# Patient Record
(demographics unavailable — no encounter records)

---

## 2024-10-16 NOTE — REVIEW OF SYSTEMS
[Night Sweats] : night sweats [Fatigue] : fatigue [Palpitations] : palpitations [SOB on Exertion] : shortness of breath during exertion [Diarrhea: Grade 0] : Diarrhea: Grade 0 [Joint Pain] : joint pain [Muscle Pain] : muscle pain [Difficulty Walking] : difficulty walking [Hot Flashes] : hot flashes [Fever] : no fever [Chills] : no chills [Chest Pain] : no chest pain [Lower Ext Edema] : no lower extremity edema [Cough] : no cough [Abdominal Pain] : no abdominal pain [Vomiting] : no vomiting [Constipation] : no constipation [Dysuria] : no dysuria [Incontinence] : no incontinence [Skin Rash] : no skin rash [Dizziness] : no dizziness [de-identified] : skin itchiness

## 2024-10-16 NOTE — ASSESSMENT
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   In March 2024 he was admitted for worsening back pain and difficulty walking.   MRI spine showed cervical cord compression. Discussed to hold off surgery and follow with ortho closely. PSA elevated at 624. Urology consulted placed a stent in left ureter due to outside mass compression to the distal ureter, did not see any mass inside the left ureter. 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   On 4/1/24 pt was started on carbo/etoposide/tecentriq, plan for 4 cycles. Received first dose of lupron 4/3/24.  5/28/24 DEXA scan showed normal density.   9/10/24 CT CAP: Diffuse metastatic disease of the bones is markedly increased since prior exams. Previously noted enlarged hilar lymph nodes are now normal in size. Delayed left nephrogram with mild to moderate left hydroureter and hydronephrosis as well as some mucosal hyperemia (of infectious or inflammatory etiology). Previously present left perinephric stranding has markedly decreased.  9/10/24 NM Bone Scan: Compared to the bone scan in 3/2024, there is overall less intense and less heterogeneous foci of increased activity in the skeleton suggesting partial favorable response to therapy.   Plan  Prostate neuroendocrine Adenocarcinoma  - Given rising PSA level ~ 159.00 pt will will be started on ADT with Abiraterone 1000mg daily + Prednisone 5 mg.  Reviewed potential AEs not limited to Hypertension, diarrhea, abnormal liver function, arthralgia, edema, Skin rash hyperglycemia and anemia. - Continue maintenance of Atezolizumab every 3 weeks  - Continue Lupron [id number], received last dose 7/8/24. Next due 10/2024  - Previously started Carboplatin/etoposide and atezolizumab [id number] on 4/1/24, s/p total for 4 cycles, C4D1 done on 6/3 -  Foundation tissue Dx: BRCA2 loss, BRIP *, myc amplification, Rb loss and p53 mutation -Given his BRCA2 loss, he will start 300mg BID olaparib  -continue to monitor its adverse effects in myelosuppression and nausea, vomiting, constipation vs diarrhea  Bone Health - Dexa scan on 5/29/24 reports Normal bone density. - Continue Ca + Vit D    Pruritis  - Continue on Zyrtec  - Continue to use Moisturizing cream  Pain  - Pt reports that Percocet isn't as effective as just Tylenol - continue gabapentin 300mg qhs  - continue percocet  RTC in 3 weeks.

## 2024-10-16 NOTE — HISTORY OF PRESENT ILLNESS
[de-identified] : Mr. Marti is a 64 yo M with PMHx of HTN, HLD, DM on metformin, chronic left shoulder/knee/hip joint pain; he had the initial medical oncology consultation for finding of left kidney on 3/1/24:  He initially started having inermittent left flank pain rated as 7/10 lasting several hours. He went to [facility] and admitted for 2 days (AMA on Feb 10th 2024 ); he had CT abd/p done there. He was told that he needs nephrectomy but he denied since "his mother had similar situation; she had nephrectomy but pathology was benign". He went to [facility] ER on 2/11/24 and had CT abd/p done. He was discharged from ER on the same day. He reports intermittent generalized fatigue, poor appetite for several months, and lost 30 lb in 8 months. He admits intermittent bone/muscle pain for several months. Denied hematuria, urine difficulty, fever, SOB at rest. He is able to walk 1-2 blocks but has   SOB on exertion.    -2/9/24 CT abd/p with IV contrast showing moderate left hydroureteronephrosis extending to the left distal ureter with a previous left ureteral mass. Findings suggestive of rupture of the left renal pelvis with prominent edema and fluid around the left kidney as well as delayed left nephrogram. Multiple enlarged retroperitoneal lymph nodes suspicious for metastases. Multiple scattered lytic and osseous sclerotic lesions concerning for metastatic disease.   -2/9/24 wbc 5.4 Hb 12.1 MCV 78.6% plt 266; K5.3 Cr 0.9 BUN 19 ; Ca 8.5 ALT 79; AST 67  albumin 3.3 Glucose 463.  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 Associated upstream mild hydroureteronephrosis. Extensive perinephric/periureteral stranding and fluid may reflect associated renal pelvic calyceal rupture as reported. Nonspecific renal pelvis and ureteral enhancement as well as bladder wall thickening. Recommend correlation with urinalysis for superimposed infection. Evidence of cheryl and osseous metastatic disease.  3/1/2024 patient had initial consultation and recommended for tissue biopsy, referred to urology. Percocet was prescribed for back pain.  3/1/24 CTA showed no pulmonary embolus, nonspecific bilateral hilar lymph nodes. diffuse skeletal metastases. 3/8/2024 Pt had Bone scan which shows widespread extensive skeletal metastasis.    Medication glipiz//metformin 5-500mg two tablets  bid  losartan one tablet once daily  jardiance 10mg daily   PSHx appendectomy >30-40 yrs ago SHx Denies ETOH/smoking. 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  FHx Denies family history of cancers/malignancies.  Allergy NKDA   3/8 Patient p/w an urgent visit. Son (Conner) and daughter(Ingrid) present. According to patient and family, patient has been having worsening pain, especially left lower back and pelvic pressure which causes left leg numbness. He was not able to walk properly for 3 days now.  Feels nausea and not able to eat well, constipated, groin pressure, difficulty urination. Percocet 5/325 does not improve his pain. plan to see urology (Dr. Jefferson) on 3/19 for tissue biopsy consultation.  3/25 he was admitted to hospital b/o rapidly worsening left flank, back pain and difficulty walking rapidly worsening left flank, back pain and difficulty walking  MRI spine showed cervical cord compression. Discussed to hold off surgery and follow with ortho closely. PSA elevated at 624. Urology consulted placed a stent in left ureter due to outside mass compression to the distal ureter, did not see any mass inside the left ureter. 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 Reports his left shoulder pain was resolved, left hip pain and lower back pain was improved. HIs dexamethasone was tapered off. He is on percocet 5-325 1tab every 88hours as needed and lidocaine topical film.  [de-identified] : prostate  [de-identified] : 4/19/24: Patient has b/l leg pain. He says he has had this pain since diagnosis, but feels it has worsened in the last week. He feels the pain when he walks, it is a throbbing sensation in his knees and calves. Patient endorses ongoing numbness in toes, no change from prior, says massage helps. Patient reports muscle twitching in arms and legs, family member endorses that they can visibly see muscles tensing.   Patient reports pain in L upper back near scapula, says it radiates around his axilla to his chest. This pain has been there for the past week. Patient feels the pain at rest and it is worse when he moves. On a scale of 1-10, the pain is rated as a 5, when he takes percocet it  improves to a 4..   Patient endorses shortness of breath on exertion, says this developed in the last three weeks, can walk 50-60 feet before becoming short of breath. He endorses palpitations on exertion. He denies cough and shortness of breath at rest. He denies chest pain.   After chemotherapy patient felt more tired for about the first week, after that he got back to his normal routine but has been doing things a bit slower. Says appetite is okay. Has not had fever or chills. No nausea or vomiting. Is slightly constipated, will sometimes go two days without having a BM. Denies sores in mouth and changes in taste. Denies rashes, does have dry skin. Reports urinary urgency, denies hematuria and dysuria.    5/29/24 reports random chill, hot flash and sweating and fatigue, improving his pain in the left upper back, bilateral legs and left pelvis. ALso states nausea, denies constipation and diarrhea. Also reports skin itchiness in both lower legs in last 4 weeks.   7/1/24 reports feeling better after completing a total of 4 cycles of chemotherapy. He reports hot flashes and sweating, shortness of breath, minor memory loss, pain in the left shoulder blade when moving around. He eats better now.   7/22/24 today is feeling well, Endorses nausea, no vomiting but with occasional constipation. He also notes pimple like cyst around the anus likely external hemorrhoids. He will increase fiber and fluids intake. In the event he is experiencing discomfort then he can use OTC witch hezel to help ease pain. Tolerating Tencentriq/ LUPRON but endorses sweating and cold alternation but tolerable.   8/14/24 pt is doing well, not in acute distress. Dependent on assistive device to get around [cane]. Endorses improvement in anal discomfort since increasing fiber and fluids. Urine flow is strong, and empties bladder completely. Appetite is good and maintaining weight. He continues to take one tablet of oxycodone due to chronic left shoulder pain, and b/l Knee joint pain [provides adequate relieve]  8/30/24 reports left shoulder, left shoulder blade and new pain in the left lower rib cage pain. Had normal appetite and energy level.   9/25/24 Pt returns today with c/o chronic left shoulder pain during movement but not at rest and denies dyspnea.  Appetite is fair and has since lost ~ 7 pounds. Pt advised to eat small frequent meals. Abdominal is soft, ND, + BS. No diarrhea or constipation. Urinary flow is strong and empties bladder completely. denies hematuria or urgency. Nocturia 5 x day, pt to reduce fluid intake during evening hours. He also endorses difficulty completing ADLs task due to shoulder pain.   10/16/24 yesterday devloped a fever 102 relieved by tylenol. So far he is afebrile. Reports multiple joint pain including bilateral hip, elbow and right shoulder. Eats btter. Reports hot flash and no sweating. He is chair bound.

## 2024-10-16 NOTE — ASSESSMENT
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   In March 2024 he was admitted for worsening back pain and difficulty walking.   MRI spine showed cervical cord compression. Discussed to hold off surgery and follow with ortho closely. PSA elevated at 624. Urology consulted placed a stent in left ureter due to outside mass compression to the distal ureter, did not see any mass inside the left ureter. 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   On 4/1/24 pt was started on carbo/etoposide/tecentriq, plan for 4 cycles. Received first dose of lupron 4/3/24.  5/28/24 DEXA scan showed normal density.   9/10/24 CT CAP: Diffuse metastatic disease of the bones is markedly increased since prior exams. Previously noted enlarged hilar lymph nodes are now normal in size. Delayed left nephrogram with mild to moderate left hydroureter and hydronephrosis as well as some mucosal hyperemia (of infectious or inflammatory etiology). Previously present left perinephric stranding has markedly decreased.  9/10/24 NM Bone Scan: Compared to the bone scan in 3/2024, there is overall less intense and less heterogeneous foci of increased activity in the skeleton suggesting partial favorable response to therapy.   Plan  Prostate neuroendocrine Adenocarcinoma  - Given rising PSA level ~ 159.00 pt will will be started on ADT with Abiraterone 1000mg daily + Prednisone 5 mg.  Reviewed potential AEs not limited to Hypertension, diarrhea, abnormal liver function, arthralgia, edema, Skin rash hyperglycemia and anemia. - Continue maintenance of Atezolizumab every 3 weeks  - Continue Lupron [id number], received last dose 7/8/24. Next due 10/2024  - Previously started Carboplatin/etoposide and atezolizumab [id number] on 4/1/24, s/p total for 4 cycles, C4D1 done on 6/3 -  Foundation tissue Dx: BRCA2 loss, BRIP *, myc amplification, Rb loss and p53 mutation -Given his BRCA2 loss, he will start 300mg BID olaparib  -continue to monitor its adverse effects in myelosuppression and nausea, vomiting, constipation vs diarrhea  Bone Health - Dexa scan on 5/29/24 reports Normal bone density. - Continue Ca + Vit D    Pruritis  - Continue on Zyrtec  - Continue to use Moisturizing cream  Pain  - Pt reports that Percocet isn't as effective as just Tylenol - continue gabapentin 300mg qhs  - continue percocet  RTC in 3 weeks.

## 2024-10-16 NOTE — PHYSICAL EXAM
[Normal] : affect appropriate [Capable of only limited self care, confined to bed or chair more than 50% of waking hours] : Status 3- Capable of only limited self care, confined to bed or chair more than 50% of waking hours [de-identified] : anicteric  [de-identified] : no edema  [de-identified] : mild tenderness to palpation of L scapula

## 2024-10-16 NOTE — HISTORY OF PRESENT ILLNESS
[de-identified] : Mr. Marti is a 64 yo M with PMHx of HTN, HLD, DM on metformin, chronic left shoulder/knee/hip joint pain; he had the initial medical oncology consultation for finding of left kidney on 3/1/24:  He initially started having inermittent left flank pain rated as 7/10 lasting several hours. He went to [facility] and admitted for 2 days (AMA on Feb 10th 2024 ); he had CT abd/p done there. He was told that he needs nephrectomy but he denied since "his mother had similar situation; she had nephrectomy but pathology was benign". He went to [facility] ER on 2/11/24 and had CT abd/p done. He was discharged from ER on the same day. He reports intermittent generalized fatigue, poor appetite for several months, and lost 30 lb in 8 months. He admits intermittent bone/muscle pain for several months. Denied hematuria, urine difficulty, fever, SOB at rest. He is able to walk 1-2 blocks but has   SOB on exertion.    -2/9/24 CT abd/p with IV contrast showing moderate left hydroureteronephrosis extending to the left distal ureter with a previous left ureteral mass. Findings suggestive of rupture of the left renal pelvis with prominent edema and fluid around the left kidney as well as delayed left nephrogram. Multiple enlarged retroperitoneal lymph nodes suspicious for metastases. Multiple scattered lytic and osseous sclerotic lesions concerning for metastatic disease.   -2/9/24 wbc 5.4 Hb 12.1 MCV 78.6% plt 266; K5.3 Cr 0.9 BUN 19 ; Ca 8.5 ALT 79; AST 67  albumin 3.3 Glucose 463.  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 Associated upstream mild hydroureteronephrosis. Extensive perinephric/periureteral stranding and fluid may reflect associated renal pelvic calyceal rupture as reported. Nonspecific renal pelvis and ureteral enhancement as well as bladder wall thickening. Recommend correlation with urinalysis for superimposed infection. Evidence of cheryl and osseous metastatic disease.  3/1/2024 patient had initial consultation and recommended for tissue biopsy, referred to urology. Percocet was prescribed for back pain.  3/1/24 CTA showed no pulmonary embolus, nonspecific bilateral hilar lymph nodes. diffuse skeletal metastases. 3/8/2024 Pt had Bone scan which shows widespread extensive skeletal metastasis.    Medication glipiz//metformin 5-500mg two tablets  bid  losartan one tablet once daily  jardiance 10mg daily   PSHx appendectomy >30-40 yrs ago SHx Denies ETOH/smoking. 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  FHx Denies family history of cancers/malignancies.  Allergy NKDA   3/8 Patient p/w an urgent visit. Son (Conner) and daughter(Ingrid) present. According to patient and family, patient has been having worsening pain, especially left lower back and pelvic pressure which causes left leg numbness. He was not able to walk properly for 3 days now.  Feels nausea and not able to eat well, constipated, groin pressure, difficulty urination. Percocet 5/325 does not improve his pain. plan to see urology (Dr. Jefferson) on 3/19 for tissue biopsy consultation.  3/25 he was admitted to hospital b/o rapidly worsening left flank, back pain and difficulty walking rapidly worsening left flank, back pain and difficulty walking  MRI spine showed cervical cord compression. Discussed to hold off surgery and follow with ortho closely. PSA elevated at 624. Urology consulted placed a stent in left ureter due to outside mass compression to the distal ureter, did not see any mass inside the left ureter. 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 Reports his left shoulder pain was resolved, left hip pain and lower back pain was improved. HIs dexamethasone was tapered off. He is on percocet 5-325 1tab every 88hours as needed and lidocaine topical film.  [de-identified] : prostate  [de-identified] : 4/19/24: Patient has b/l leg pain. He says he has had this pain since diagnosis, but feels it has worsened in the last week. He feels the pain when he walks, it is a throbbing sensation in his knees and calves. Patient endorses ongoing numbness in toes, no change from prior, says massage helps. Patient reports muscle twitching in arms and legs, family member endorses that they can visibly see muscles tensing.   Patient reports pain in L upper back near scapula, says it radiates around his axilla to his chest. This pain has been there for the past week. Patient feels the pain at rest and it is worse when he moves. On a scale of 1-10, the pain is rated as a 5, when he takes percocet it  improves to a 4..   Patient endorses shortness of breath on exertion, says this developed in the last three weeks, can walk 50-60 feet before becoming short of breath. He endorses palpitations on exertion. He denies cough and shortness of breath at rest. He denies chest pain.   After chemotherapy patient felt more tired for about the first week, after that he got back to his normal routine but has been doing things a bit slower. Says appetite is okay. Has not had fever or chills. No nausea or vomiting. Is slightly constipated, will sometimes go two days without having a BM. Denies sores in mouth and changes in taste. Denies rashes, does have dry skin. Reports urinary urgency, denies hematuria and dysuria.    5/29/24 reports random chill, hot flash and sweating and fatigue, improving his pain in the left upper back, bilateral legs and left pelvis. ALso states nausea, denies constipation and diarrhea. Also reports skin itchiness in both lower legs in last 4 weeks.   7/1/24 reports feeling better after completing a total of 4 cycles of chemotherapy. He reports hot flashes and sweating, shortness of breath, minor memory loss, pain in the left shoulder blade when moving around. He eats better now.   7/22/24 today is feeling well, Endorses nausea, no vomiting but with occasional constipation. He also notes pimple like cyst around the anus likely external hemorrhoids. He will increase fiber and fluids intake. In the event he is experiencing discomfort then he can use OTC witch hezel to help ease pain. Tolerating Tencentriq/ LUPRON but endorses sweating and cold alternation but tolerable.   8/14/24 pt is doing well, not in acute distress. Dependent on assistive device to get around [cane]. Endorses improvement in anal discomfort since increasing fiber and fluids. Urine flow is strong, and empties bladder completely. Appetite is good and maintaining weight. He continues to take one tablet of oxycodone due to chronic left shoulder pain, and b/l Knee joint pain [provides adequate relieve]  8/30/24 reports left shoulder, left shoulder blade and new pain in the left lower rib cage pain. Had normal appetite and energy level.   9/25/24 Pt returns today with c/o chronic left shoulder pain during movement but not at rest and denies dyspnea.  Appetite is fair and has since lost ~ 7 pounds. Pt advised to eat small frequent meals. Abdominal is soft, ND, + BS. No diarrhea or constipation. Urinary flow is strong and empties bladder completely. denies hematuria or urgency. Nocturia 5 x day, pt to reduce fluid intake during evening hours. He also endorses difficulty completing ADLs task due to shoulder pain.   10/16/24 yesterday devloped a fever 102 relieved by tylenol. So far he is afebrile. Reports multiple joint pain including bilateral hip, elbow and right shoulder. Eats btter. Reports hot flash and no sweating. He is chair bound.

## 2024-10-16 NOTE — REASON FOR VISIT
[Follow-Up Visit] : a follow-up [Family Member] : family member [FreeTextEntry2] : left renal and distal ureteral mass

## 2024-10-16 NOTE — PHYSICAL EXAM
[Normal] : affect appropriate [Capable of only limited self care, confined to bed or chair more than 50% of waking hours] : Status 3- Capable of only limited self care, confined to bed or chair more than 50% of waking hours [de-identified] : anicteric  [de-identified] : no edema  [de-identified] : mild tenderness to palpation of L scapula

## 2024-10-16 NOTE — REVIEW OF SYSTEMS
[Night Sweats] : night sweats [Fatigue] : fatigue [Palpitations] : palpitations [SOB on Exertion] : shortness of breath during exertion [Diarrhea: Grade 0] : Diarrhea: Grade 0 [Joint Pain] : joint pain [Muscle Pain] : muscle pain [Difficulty Walking] : difficulty walking [Hot Flashes] : hot flashes [Fever] : no fever [Chills] : no chills [Chest Pain] : no chest pain [Lower Ext Edema] : no lower extremity edema [Cough] : no cough [Abdominal Pain] : no abdominal pain [Vomiting] : no vomiting [Constipation] : no constipation [Dysuria] : no dysuria [Incontinence] : no incontinence [Skin Rash] : no skin rash [Dizziness] : no dizziness [de-identified] : skin itchiness

## 2024-11-07 NOTE — ASSESSMENT
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   In March 2024 he was admitted for worsening back pain and difficulty walking.   MRI spine showed cervical cord compression. Discussed to hold off surgery and follow with ortho closely. PSA elevated at 624. Urology consulted placed a stent in left ureter due to outside mass compression to the distal ureter, did not see any mass inside the left ureter. 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   On 4/1/24 pt was started on carbo/etoposide/tecentriq, plan for 4 cycles. Received first dose of lupron 4/3/24.  5/28/24 DEXA scan showed normal density.   9/10/24 CT CAP: Diffuse metastatic disease of the bones is markedly increased since prior exams. Previously noted enlarged hilar lymph nodes are now normal in size. Delayed left nephrogram with mild to moderate left hydroureter and hydronephrosis as well as some mucosal hyperemia (of infectious or inflammatory etiology). Previously present left perinephric stranding has markedly decreased.  9/10/24 NM Bone Scan: Compared to the bone scan in 3/2024, there is overall less intense and less heterogeneous foci of increased activity in the skeleton suggesting partial favorable response to therapy.   Plan   Prostate neuroendocrine adenocarcinoma: - PSA lennie rapidly from 57.3 on 9/3/24 to 159 on 9/25/24.  - Foundation tissue Dx: BRCA2 loss, BRIP *, myc amplification, Rb loss and p53 mutation - Continue abiraterone 1,000mg daily + prednisone 5mg daily and olaparib 300mg bid. Reviewed potential AEs not limited to hypertension, abnormal liver function, edema, arthralgia, diarrhea, skin rash, hyperglycemia, cytopenias, fatigue, nausea/vomiting, decreased appetite.  - Continue Lupron [id number], last given 10/19/24, next scheduled for 1/16/25.  - PSA is 359 today, continues to rise rapidly however he has only been on this treatment for about 1 month. No symptoms concerning for clinical progression. Per d/w Dr. Vergara, continue abiraterone + olaparib as prescribed with close monitoring.   Bone mets: - Dexa scan on 5/29/24 reports Normal bone density. 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 Potential side effects reviewed including but not limited to hypocalcemia and ONJ. Instructed to obtain dental clearance prior to start of Xgeva.   Pain  - Ongoing pain of left scapula and L posterior ribs, bilateral shoulders, bilateral knees, bilateral ankles, somewhat improved, max pain is 5/10. Taking PRN Percocet 5/325mg occasionally but not every day, this has been effective.  - Continue gabapentin 300mg qhs  - continue PRN Percocet 5/325mg as prescribed.   Instructed to contact our office with any new/worsening symptoms. Pt educated regarding plan of care, all questions/concerns addressed to the best of my abilities and his apparent satisfaction. F/u in 3wks for close monitoring at start of treatment with abiraterone + olaparib.

## 2024-11-07 NOTE — HISTORY OF PRESENT ILLNESS
[de-identified] : Mr. Marti is a 62 yo M with PMHx of HTN, HLD, DM on metformin, chronic left shoulder/knee/hip joint pain; he had the initial medical oncology consultation for finding of left kidney on 3/1/24:  He initially started having inermittent left flank pain rated as 7/10 lasting several hours. He went to [facility] and admitted for 2 days (AMA on Feb 10th 2024 ); he had CT abd/p done there. He was told that he needs nephrectomy but he denied since "his mother had similar situation; she had nephrectomy but pathology was benign". He went to [facility] ER on 2/11/24 and had CT abd/p done. He was discharged from ER on the same day. He reports intermittent generalized fatigue, poor appetite for several months, and lost 30 lb in 8 months. He admits intermittent bone/muscle pain for several months. Denied hematuria, urine difficulty, fever, SOB at rest. He is able to walk 1-2 blocks but has   SOB on exertion.    -2/9/24 CT abd/p with IV contrast showing moderate left hydroureteronephrosis extending to the left distal ureter with a previous left ureteral mass. Findings suggestive of rupture of the left renal pelvis with prominent edema and fluid around the left kidney as well as delayed left nephrogram. Multiple enlarged retroperitoneal lymph nodes suspicious for metastases. Multiple scattered lytic and osseous sclerotic lesions concerning for metastatic disease.   -2/9/24 wbc 5.4 Hb 12.1 MCV 78.6% plt 266; K5.3 Cr 0.9 BUN 19 ; Ca 8.5 ALT 79; AST 67  albumin 3.3 Glucose 463.  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 Associated upstream mild hydroureteronephrosis. Extensive perinephric/periureteral stranding and fluid may reflect associated renal pelvic calyceal rupture as reported. Nonspecific renal pelvis and ureteral enhancement as well as bladder wall thickening. Recommend correlation with urinalysis for superimposed infection. Evidence of cheryl and osseous metastatic disease.  3/1/2024 patient had initial consultation and recommended for tissue biopsy, referred to urology. Percocet was prescribed for back pain.  3/1/24 CTA showed no pulmonary embolus, nonspecific bilateral hilar lymph nodes. diffuse skeletal metastases. 3/8/2024 Pt had Bone scan which shows widespread extensive skeletal metastasis.    Medication glipiz//metformin 5-500mg two tablets  bid  losartan one tablet once daily  jardiance 10mg daily   PSHx appendectomy >30-40 yrs ago SHx Denies ETOH/smoking. 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  FHx Denies family history of cancers/malignancies.  Allergy NKDA   3/8 Patient p/w an urgent visit. Son (Conner) and daughter(Ingrid) present. According to patient and family, patient has been having worsening pain, especially left lower back and pelvic pressure which causes left leg numbness. He was not able to walk properly for 3 days now.  Feels nausea and not able to eat well, constipated, groin pressure, difficulty urination. Percocet 5/325 does not improve his pain. plan to see urology (Dr. Jefferson) on 3/19 for tissue biopsy consultation.  3/25 he was admitted to hospital b/o rapidly worsening left flank, back pain and difficulty walking rapidly worsening left flank, back pain and difficulty walking  MRI spine showed cervical cord compression. Discussed to hold off surgery and follow with ortho closely. PSA elevated at 624. Urology consulted placed a stent in left ureter due to outside mass compression to the distal ureter, did not see any mass inside the left ureter. 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 Reports his left shoulder pain was resolved, left hip pain and lower back pain was improved. HIs dexamethasone was tapered off. He is on percocet 5-325 1tab every 88hours as needed and lidocaine topical film.  [de-identified] : prostate  [de-identified] : 4/19/24: Patient has b/l leg pain. He says he has had this pain since diagnosis, but feels it has worsened in the last week. He feels the pain when he walks, it is a throbbing sensation in his knees and calves. Patient endorses ongoing numbness in toes, no change from prior, says massage helps. Patient reports muscle twitching in arms and legs, family member endorses that they can visibly see muscles tensing.   Patient reports pain in L upper back near scapula, says it radiates around his axilla to his chest. This pain has been there for the past week. Patient feels the pain at rest and it is worse when he moves. On a scale of 1-10, the pain is rated as a 5, when he takes percocet it  improves to a 4..   Patient endorses shortness of breath on exertion, says this developed in the last three weeks, can walk 50-60 feet before becoming short of breath. He endorses palpitations on exertion. He denies cough and shortness of breath at rest. He denies chest pain.   After chemotherapy patient felt more tired for about the first week, after that he got back to his normal routine but has been doing things a bit slower. Says appetite is okay. Has not had fever or chills. No nausea or vomiting. Is slightly constipated, will sometimes go two days without having a BM. Denies sores in mouth and changes in taste. Denies rashes, does have dry skin. Reports urinary urgency, denies hematuria and dysuria.    5/29/24 reports random chill, hot flash and sweating and fatigue, improving his pain in the left upper back, bilateral legs and left pelvis. ALso states nausea, denies constipation and diarrhea. Also reports skin itchiness in both lower legs in last 4 weeks.   7/1/24 reports feeling better after completing a total of 4 cycles of chemotherapy. He reports hot flashes and sweating, shortness of breath, minor memory loss, pain in the left shoulder blade when moving around. He eats better now.   7/22/24 today is feeling well, Endorses nausea, no vomiting but with occasional constipation. He also notes pimple like cyst around the anus likely external hemorrhoids. He will increase fiber and fluids intake. In the event he is experiencing discomfort then he can use OTC witch hezel to help ease pain. Tolerating Tencentriq/ LUPRON but endorses sweating and cold alternation but tolerable.   8/14/24 pt is doing well, not in acute distress. Dependent on assistive device to get around [cane]. Endorses improvement in anal discomfort since increasing fiber and fluids. Urine flow is strong, and empties bladder completely. Appetite is good and maintaining weight. He continues to take one tablet of oxycodone due to chronic left shoulder pain, and b/l Knee joint pain [provides adequate relieve]  8/30/24 reports left shoulder, left shoulder blade and new pain in the left lower rib cage pain. Had normal appetite and energy level.   9/25/24 Pt returns today with c/o chronic left shoulder pain during movement but not at rest and denies dyspnea.  Appetite is fair and has since lost ~ 7 pounds. Pt advised to eat small frequent meals. Abdominal is soft, ND, + BS. No diarrhea or constipation. Urinary flow is strong and empties bladder completely. denies hematuria or urgency. Nocturia 5 x day, pt to reduce fluid intake during evening hours. He also endorses difficulty completing ADLs task due to shoulder pain.   10/16/24 yesterday devloped a fever 102 relieved by tylenol. So far he is afebrile. Reports multiple joint pain including bilateral hip, elbow and right shoulder. Eats btter. Reports hot flash and no sweating. He is chair bound.   11/6/25 - abiraterone started late Sept 2024 + olaparib started mid Oct 2024. Notes fatigue, with 2-3 naps a day which is stable since before start of abiraterone and olaparib. Ongoing hot flashes. Dizziness and nausea with standing for too long. Needs assistance with ambulation, dressing, bathing. Can walk short distances with a 1 person assist, does not use a walker as it doesn't fit in his house. Appetite is stable. Mild SOB with exertion, resolves with rest. Urine flow and urgency are improved, 3-4x/night. Ongoing pain of left scapula and L posterior ribs, bilateral shoulders, bilateral knees, bilateral ankles, somewhat improved, max pain is 5/10. Taking PRN Percocet 5/325mg occasionally but not every day.

## 2024-11-07 NOTE — HISTORY OF PRESENT ILLNESS
[de-identified] : Mr. Marti is a 64 yo M with PMHx of HTN, HLD, DM on metformin, chronic left shoulder/knee/hip joint pain; he had the initial medical oncology consultation for finding of left kidney on 3/1/24:  He initially started having inermittent left flank pain rated as 7/10 lasting several hours. He went to [facility] and admitted for 2 days (AMA on Feb 10th 2024 ); he had CT abd/p done there. He was told that he needs nephrectomy but he denied since "his mother had similar situation; she had nephrectomy but pathology was benign". He went to [facility] ER on 2/11/24 and had CT abd/p done. He was discharged from ER on the same day. He reports intermittent generalized fatigue, poor appetite for several months, and lost 30 lb in 8 months. He admits intermittent bone/muscle pain for several months. Denied hematuria, urine difficulty, fever, SOB at rest. He is able to walk 1-2 blocks but has   SOB on exertion.    -2/9/24 CT abd/p with IV contrast showing moderate left hydroureteronephrosis extending to the left distal ureter with a previous left ureteral mass. Findings suggestive of rupture of the left renal pelvis with prominent edema and fluid around the left kidney as well as delayed left nephrogram. Multiple enlarged retroperitoneal lymph nodes suspicious for metastases. Multiple scattered lytic and osseous sclerotic lesions concerning for metastatic disease.   -2/9/24 wbc 5.4 Hb 12.1 MCV 78.6% plt 266; K5.3 Cr 0.9 BUN 19 ; Ca 8.5 ALT 79; AST 67  albumin 3.3 Glucose 463.  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 Associated upstream mild hydroureteronephrosis. Extensive perinephric/periureteral stranding and fluid may reflect associated renal pelvic calyceal rupture as reported. Nonspecific renal pelvis and ureteral enhancement as well as bladder wall thickening. Recommend correlation with urinalysis for superimposed infection. Evidence of cheryl and osseous metastatic disease.  3/1/2024 patient had initial consultation and recommended for tissue biopsy, referred to urology. Percocet was prescribed for back pain.  3/1/24 CTA showed no pulmonary embolus, nonspecific bilateral hilar lymph nodes. diffuse skeletal metastases. 3/8/2024 Pt had Bone scan which shows widespread extensive skeletal metastasis.    Medication glipiz//metformin 5-500mg two tablets  bid  losartan one tablet once daily  jardiance 10mg daily   PSHx appendectomy >30-40 yrs ago SHx Denies ETOH/smoking. 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  FHx Denies family history of cancers/malignancies.  Allergy NKDA   3/8 Patient p/w an urgent visit. Son (Conner) and daughter(Ingrid) present. According to patient and family, patient has been having worsening pain, especially left lower back and pelvic pressure which causes left leg numbness. He was not able to walk properly for 3 days now.  Feels nausea and not able to eat well, constipated, groin pressure, difficulty urination. Percocet 5/325 does not improve his pain. plan to see urology (Dr. Jefferson) on 3/19 for tissue biopsy consultation.  3/25 he was admitted to hospital b/o rapidly worsening left flank, back pain and difficulty walking rapidly worsening left flank, back pain and difficulty walking  MRI spine showed cervical cord compression. Discussed to hold off surgery and follow with ortho closely. PSA elevated at 624. Urology consulted placed a stent in left ureter due to outside mass compression to the distal ureter, did not see any mass inside the left ureter. 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 Reports his left shoulder pain was resolved, left hip pain and lower back pain was improved. HIs dexamethasone was tapered off. He is on percocet 5-325 1tab every 88hours as needed and lidocaine topical film.  [de-identified] : prostate  [de-identified] : 4/19/24: Patient has b/l leg pain. He says he has had this pain since diagnosis, but feels it has worsened in the last week. He feels the pain when he walks, it is a throbbing sensation in his knees and calves. Patient endorses ongoing numbness in toes, no change from prior, says massage helps. Patient reports muscle twitching in arms and legs, family member endorses that they can visibly see muscles tensing.   Patient reports pain in L upper back near scapula, says it radiates around his axilla to his chest. This pain has been there for the past week. Patient feels the pain at rest and it is worse when he moves. On a scale of 1-10, the pain is rated as a 5, when he takes percocet it  improves to a 4..   Patient endorses shortness of breath on exertion, says this developed in the last three weeks, can walk 50-60 feet before becoming short of breath. He endorses palpitations on exertion. He denies cough and shortness of breath at rest. He denies chest pain.   After chemotherapy patient felt more tired for about the first week, after that he got back to his normal routine but has been doing things a bit slower. Says appetite is okay. Has not had fever or chills. No nausea or vomiting. Is slightly constipated, will sometimes go two days without having a BM. Denies sores in mouth and changes in taste. Denies rashes, does have dry skin. Reports urinary urgency, denies hematuria and dysuria.    5/29/24 reports random chill, hot flash and sweating and fatigue, improving his pain in the left upper back, bilateral legs and left pelvis. ALso states nausea, denies constipation and diarrhea. Also reports skin itchiness in both lower legs in last 4 weeks.   7/1/24 reports feeling better after completing a total of 4 cycles of chemotherapy. He reports hot flashes and sweating, shortness of breath, minor memory loss, pain in the left shoulder blade when moving around. He eats better now.   7/22/24 today is feeling well, Endorses nausea, no vomiting but with occasional constipation. He also notes pimple like cyst around the anus likely external hemorrhoids. He will increase fiber and fluids intake. In the event he is experiencing discomfort then he can use OTC witch hezel to help ease pain. Tolerating Tencentriq/ LUPRON but endorses sweating and cold alternation but tolerable.   8/14/24 pt is doing well, not in acute distress. Dependent on assistive device to get around [cane]. Endorses improvement in anal discomfort since increasing fiber and fluids. Urine flow is strong, and empties bladder completely. Appetite is good and maintaining weight. He continues to take one tablet of oxycodone due to chronic left shoulder pain, and b/l Knee joint pain [provides adequate relieve]  8/30/24 reports left shoulder, left shoulder blade and new pain in the left lower rib cage pain. Had normal appetite and energy level.   9/25/24 Pt returns today with c/o chronic left shoulder pain during movement but not at rest and denies dyspnea.  Appetite is fair and has since lost ~ 7 pounds. Pt advised to eat small frequent meals. Abdominal is soft, ND, + BS. No diarrhea or constipation. Urinary flow is strong and empties bladder completely. denies hematuria or urgency. Nocturia 5 x day, pt to reduce fluid intake during evening hours. He also endorses difficulty completing ADLs task due to shoulder pain.   10/16/24 yesterday devloped a fever 102 relieved by tylenol. So far he is afebrile. Reports multiple joint pain including bilateral hip, elbow and right shoulder. Eats btter. Reports hot flash and no sweating. He is chair bound.   11/6/25 - abiraterone started late Sept 2024 + olaparib started mid Oct 2024. Notes fatigue, with 2-3 naps a day which is stable since before start of abiraterone and olaparib. Ongoing hot flashes. Dizziness and nausea with standing for too long. Needs assistance with ambulation, dressing, bathing. Can walk short distances with a 1 person assist, does not use a walker as it doesn't fit in his house. Appetite is stable. Mild SOB with exertion, resolves with rest. Urine flow and urgency are improved, 3-4x/night. Ongoing pain of left scapula and L posterior ribs, bilateral shoulders, bilateral knees, bilateral ankles, somewhat improved, max pain is 5/10. Taking PRN Percocet 5/325mg occasionally but not every day.

## 2024-11-07 NOTE — PHYSICAL EXAM
[Capable of only limited self care, confined to bed or chair more than 50% of waking hours] : Status 3- Capable of only limited self care, confined to bed or chair more than 50% of waking hours [Normal] : affect appropriate [de-identified] : anicteric  [de-identified] : no LE edema  [de-identified] : arrives by wheelchair today

## 2024-11-07 NOTE — HISTORY OF PRESENT ILLNESS
[de-identified] : Mr. Marti is a 64 yo M with PMHx of HTN, HLD, DM on metformin, chronic left shoulder/knee/hip joint pain; he had the initial medical oncology consultation for finding of left kidney on 3/1/24:  He initially started having inermittent left flank pain rated as 7/10 lasting several hours. He went to [facility] and admitted for 2 days (AMA on Feb 10th 2024 ); he had CT abd/p done there. He was told that he needs nephrectomy but he denied since "his mother had similar situation; she had nephrectomy but pathology was benign". He went to [facility] ER on 2/11/24 and had CT abd/p done. He was discharged from ER on the same day. He reports intermittent generalized fatigue, poor appetite for several months, and lost 30 lb in 8 months. He admits intermittent bone/muscle pain for several months. Denied hematuria, urine difficulty, fever, SOB at rest. He is able to walk 1-2 blocks but has   SOB on exertion.    -2/9/24 CT abd/p with IV contrast showing moderate left hydroureteronephrosis extending to the left distal ureter with a previous left ureteral mass. Findings suggestive of rupture of the left renal pelvis with prominent edema and fluid around the left kidney as well as delayed left nephrogram. Multiple enlarged retroperitoneal lymph nodes suspicious for metastases. Multiple scattered lytic and osseous sclerotic lesions concerning for metastatic disease.   -2/9/24 wbc 5.4 Hb 12.1 MCV 78.6% plt 266; K5.3 Cr 0.9 BUN 19 ; Ca 8.5 ALT 79; AST 67  albumin 3.3 Glucose 463.  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 Associated upstream mild hydroureteronephrosis. Extensive perinephric/periureteral stranding and fluid may reflect associated renal pelvic calyceal rupture as reported. Nonspecific renal pelvis and ureteral enhancement as well as bladder wall thickening. Recommend correlation with urinalysis for superimposed infection. Evidence of cheryl and osseous metastatic disease.  3/1/2024 patient had initial consultation and recommended for tissue biopsy, referred to urology. Percocet was prescribed for back pain.  3/1/24 CTA showed no pulmonary embolus, nonspecific bilateral hilar lymph nodes. diffuse skeletal metastases. 3/8/2024 Pt had Bone scan which shows widespread extensive skeletal metastasis.    Medication glipiz//metformin 5-500mg two tablets  bid  losartan one tablet once daily  jardiance 10mg daily   PSHx appendectomy >30-40 yrs ago SHx Denies ETOH/smoking. 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  FHx Denies family history of cancers/malignancies.  Allergy NKDA   3/8 Patient p/w an urgent visit. Son (Conner) and daughter(Ingrid) present. According to patient and family, patient has been having worsening pain, especially left lower back and pelvic pressure which causes left leg numbness. He was not able to walk properly for 3 days now.  Feels nausea and not able to eat well, constipated, groin pressure, difficulty urination. Percocet 5/325 does not improve his pain. plan to see urology (Dr. Jefferson) on 3/19 for tissue biopsy consultation.  3/25 he was admitted to hospital b/o rapidly worsening left flank, back pain and difficulty walking rapidly worsening left flank, back pain and difficulty walking  MRI spine showed cervical cord compression. Discussed to hold off surgery and follow with ortho closely. PSA elevated at 624. Urology consulted placed a stent in left ureter due to outside mass compression to the distal ureter, did not see any mass inside the left ureter. 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 Reports his left shoulder pain was resolved, left hip pain and lower back pain was improved. HIs dexamethasone was tapered off. He is on percocet 5-325 1tab every 88hours as needed and lidocaine topical film.  [de-identified] : prostate  [de-identified] : 4/19/24: Patient has b/l leg pain. He says he has had this pain since diagnosis, but feels it has worsened in the last week. He feels the pain when he walks, it is a throbbing sensation in his knees and calves. Patient endorses ongoing numbness in toes, no change from prior, says massage helps. Patient reports muscle twitching in arms and legs, family member endorses that they can visibly see muscles tensing.   Patient reports pain in L upper back near scapula, says it radiates around his axilla to his chest. This pain has been there for the past week. Patient feels the pain at rest and it is worse when he moves. On a scale of 1-10, the pain is rated as a 5, when he takes percocet it  improves to a 4..   Patient endorses shortness of breath on exertion, says this developed in the last three weeks, can walk 50-60 feet before becoming short of breath. He endorses palpitations on exertion. He denies cough and shortness of breath at rest. He denies chest pain.   After chemotherapy patient felt more tired for about the first week, after that he got back to his normal routine but has been doing things a bit slower. Says appetite is okay. Has not had fever or chills. No nausea or vomiting. Is slightly constipated, will sometimes go two days without having a BM. Denies sores in mouth and changes in taste. Denies rashes, does have dry skin. Reports urinary urgency, denies hematuria and dysuria.    5/29/24 reports random chill, hot flash and sweating and fatigue, improving his pain in the left upper back, bilateral legs and left pelvis. ALso states nausea, denies constipation and diarrhea. Also reports skin itchiness in both lower legs in last 4 weeks.   7/1/24 reports feeling better after completing a total of 4 cycles of chemotherapy. He reports hot flashes and sweating, shortness of breath, minor memory loss, pain in the left shoulder blade when moving around. He eats better now.   7/22/24 today is feeling well, Endorses nausea, no vomiting but with occasional constipation. He also notes pimple like cyst around the anus likely external hemorrhoids. He will increase fiber and fluids intake. In the event he is experiencing discomfort then he can use OTC witch hezel to help ease pain. Tolerating Tencentriq/ LUPRON but endorses sweating and cold alternation but tolerable.   8/14/24 pt is doing well, not in acute distress. Dependent on assistive device to get around [cane]. Endorses improvement in anal discomfort since increasing fiber and fluids. Urine flow is strong, and empties bladder completely. Appetite is good and maintaining weight. He continues to take one tablet of oxycodone due to chronic left shoulder pain, and b/l Knee joint pain [provides adequate relieve]  8/30/24 reports left shoulder, left shoulder blade and new pain in the left lower rib cage pain. Had normal appetite and energy level.   9/25/24 Pt returns today with c/o chronic left shoulder pain during movement but not at rest and denies dyspnea.  Appetite is fair and has since lost ~ 7 pounds. Pt advised to eat small frequent meals. Abdominal is soft, ND, + BS. No diarrhea or constipation. Urinary flow is strong and empties bladder completely. denies hematuria or urgency. Nocturia 5 x day, pt to reduce fluid intake during evening hours. He also endorses difficulty completing ADLs task due to shoulder pain.   10/16/24 yesterday devloped a fever 102 relieved by tylenol. So far he is afebrile. Reports multiple joint pain including bilateral hip, elbow and right shoulder. Eats btter. Reports hot flash and no sweating. He is chair bound.   11/6/25 - abiraterone started late Sept 2024 + olaparib started mid Oct 2024. Notes fatigue, with 2-3 naps a day which is stable since before start of abiraterone and olaparib. Ongoing hot flashes. Dizziness and nausea with standing for too long. Needs assistance with ambulation, dressing, bathing. Can walk short distances with a 1 person assist, does not use a walker as it doesn't fit in his house. Appetite is stable. Mild SOB with exertion, resolves with rest. Urine flow and urgency are improved, 3-4x/night. Ongoing pain of left scapula and L posterior ribs, bilateral shoulders, bilateral knees, bilateral ankles, somewhat improved, max pain is 5/10. Taking PRN Percocet 5/325mg occasionally but not every day.

## 2024-11-07 NOTE — PHYSICAL EXAM
[Capable of only limited self care, confined to bed or chair more than 50% of waking hours] : Status 3- Capable of only limited self care, confined to bed or chair more than 50% of waking hours [Normal] : affect appropriate [de-identified] : anicteric  [de-identified] : no LE edema  [de-identified] : arrives by wheelchair today

## 2024-11-07 NOTE — ASSESSMENT
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   In March 2024 he was admitted for worsening back pain and difficulty walking.   MRI spine showed cervical cord compression. Discussed to hold off surgery and follow with ortho closely. PSA elevated at 624. Urology consulted placed a stent in left ureter due to outside mass compression to the distal ureter, did not see any mass inside the left ureter. 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   On 4/1/24 pt was started on carbo/etoposide/tecentriq, plan for 4 cycles. Received first dose of lupron 4/3/24.  5/28/24 DEXA scan showed normal density.   9/10/24 CT CAP: Diffuse metastatic disease of the bones is markedly increased since prior exams. Previously noted enlarged hilar lymph nodes are now normal in size. Delayed left nephrogram with mild to moderate left hydroureter and hydronephrosis as well as some mucosal hyperemia (of infectious or inflammatory etiology). Previously present left perinephric stranding has markedly decreased.  9/10/24 NM Bone Scan: Compared to the bone scan in 3/2024, there is overall less intense and less heterogeneous foci of increased activity in the skeleton suggesting partial favorable response to therapy.   Plan   Prostate neuroendocrine adenocarcinoma: - PSA lennie rapidly from 57.3 on 9/3/24 to 159 on 9/25/24.  - Foundation tissue Dx: BRCA2 loss, BRIP *, myc amplification, Rb loss and p53 mutation - Continue abiraterone 1,000mg daily + prednisone 5mg daily and olaparib 300mg bid. Reviewed potential AEs not limited to hypertension, abnormal liver function, edema, arthralgia, diarrhea, skin rash, hyperglycemia, cytopenias, fatigue, nausea/vomiting, decreased appetite.  - Continue Lupron [id number], last given 10/19/24, next scheduled for 1/16/25.  - PSA is 359 today, continues to rise rapidly however he has only been on this treatment for about 1 month. No symptoms concerning for clinical progression. Per d/w Dr. Vergara, continue abiraterone + olaparib as prescribed with close monitoring.   Bone mets: - Dexa scan on 5/29/24 reports Normal bone density. 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 Potential side effects reviewed including but not limited to hypocalcemia and ONJ. Instructed to obtain dental clearance prior to start of Xgeva.   Pain  - Ongoing pain of left scapula and L posterior ribs, bilateral shoulders, bilateral knees, bilateral ankles, somewhat improved, max pain is 5/10. Taking PRN Percocet 5/325mg occasionally but not every day, this has been effective.  - Continue gabapentin 300mg qhs  - continue PRN Percocet 5/325mg as prescribed.   Instructed to contact our office with any new/worsening symptoms. Pt educated regarding plan of care, all questions/concerns addressed to the best of my abilities and his apparent satisfaction. F/u in 3wks for close monitoring at start of treatment with abiraterone + olaparib.

## 2024-11-07 NOTE — ASSESSMENT
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   In March 2024 he was admitted for worsening back pain and difficulty walking.   MRI spine showed cervical cord compression. Discussed to hold off surgery and follow with ortho closely. PSA elevated at 624. Urology consulted placed a stent in left ureter due to outside mass compression to the distal ureter, did not see any mass inside the left ureter. 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   On 4/1/24 pt was started on carbo/etoposide/tecentriq, plan for 4 cycles. Received first dose of lupron 4/3/24.  5/28/24 DEXA scan showed normal density.   9/10/24 CT CAP: Diffuse metastatic disease of the bones is markedly increased since prior exams. Previously noted enlarged hilar lymph nodes are now normal in size. Delayed left nephrogram with mild to moderate left hydroureter and hydronephrosis as well as some mucosal hyperemia (of infectious or inflammatory etiology). Previously present left perinephric stranding has markedly decreased.  9/10/24 NM Bone Scan: Compared to the bone scan in 3/2024, there is overall less intense and less heterogeneous foci of increased activity in the skeleton suggesting partial favorable response to therapy.   Plan   Prostate neuroendocrine adenocarcinoma: - PSA lennie rapidly from 57.3 on 9/3/24 to 159 on 9/25/24.  - Foundation tissue Dx: BRCA2 loss, BRIP *, myc amplification, Rb loss and p53 mutation - Continue abiraterone 1,000mg daily + prednisone 5mg daily and olaparib 300mg bid. Reviewed potential AEs not limited to hypertension, abnormal liver function, edema, arthralgia, diarrhea, skin rash, hyperglycemia, cytopenias, fatigue, nausea/vomiting, decreased appetite.  - Continue Lupron [id number], last given 10/19/24, next scheduled for 1/16/25.  - PSA is 359 today, continues to rise rapidly however he has only been on this treatment for about 1 month. No symptoms concerning for clinical progression. Per d/w Dr. Vergara, continue abiraterone + olaparib as prescribed with close monitoring.   Bone mets: - Dexa scan on 5/29/24 reports Normal bone density. 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 Potential side effects reviewed including but not limited to hypocalcemia and ONJ. Instructed to obtain dental clearance prior to start of Xgeva.   Pain  - Ongoing pain of left scapula and L posterior ribs, bilateral shoulders, bilateral knees, bilateral ankles, somewhat improved, max pain is 5/10. Taking PRN Percocet 5/325mg occasionally but not every day, this has been effective.  - Continue gabapentin 300mg qhs  - continue PRN Percocet 5/325mg as prescribed.   Instructed to contact our office with any new/worsening symptoms. Pt educated regarding plan of care, all questions/concerns addressed to the best of my abilities and his apparent satisfaction. F/u in 3wks for close monitoring at start of treatment with abiraterone + olaparib.

## 2024-11-07 NOTE — ASSESSMENT
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   In March 2024 he was admitted for worsening back pain and difficulty walking.   MRI spine showed cervical cord compression. Discussed to hold off surgery and follow with ortho closely. PSA elevated at 624. Urology consulted placed a stent in left ureter due to outside mass compression to the distal ureter, did not see any mass inside the left ureter. 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   On 4/1/24 pt was started on carbo/etoposide/tecentriq, plan for 4 cycles. Received first dose of lupron 4/3/24.  5/28/24 DEXA scan showed normal density.   9/10/24 CT CAP: Diffuse metastatic disease of the bones is markedly increased since prior exams. Previously noted enlarged hilar lymph nodes are now normal in size. Delayed left nephrogram with mild to moderate left hydroureter and hydronephrosis as well as some mucosal hyperemia (of infectious or inflammatory etiology). Previously present left perinephric stranding has markedly decreased.  9/10/24 NM Bone Scan: Compared to the bone scan in 3/2024, there is overall less intense and less heterogeneous foci of increased activity in the skeleton suggesting partial favorable response to therapy.   Plan   Prostate neuroendocrine adenocarcinoma: - PSA lennie rapidly from 57.3 on 9/3/24 to 159 on 9/25/24.  - Foundation tissue Dx: BRCA2 loss, BRIP *, myc amplification, Rb loss and p53 mutation - Continue abiraterone 1,000mg daily + prednisone 5mg daily and olaparib 300mg bid. Reviewed potential AEs not limited to hypertension, abnormal liver function, edema, arthralgia, diarrhea, skin rash, hyperglycemia, cytopenias, fatigue, nausea/vomiting, decreased appetite.  - Continue Lupron [id number], last given 10/19/24, next scheduled for 1/16/25.  - PSA is 359 today, continues to rise rapidly however he has only been on this treatment for about 1 month. No symptoms concerning for clinical progression. Per d/w Dr. Vergara, continue abiraterone + olaparib as prescribed with close monitoring.   Bone mets: - Dexa scan on 5/29/24 reports Normal bone density. 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 Potential side effects reviewed including but not limited to hypocalcemia and ONJ. Instructed to obtain dental clearance prior to start of Xgeva.   Pain  - Ongoing pain of left scapula and L posterior ribs, bilateral shoulders, bilateral knees, bilateral ankles, somewhat improved, max pain is 5/10. Taking PRN Percocet 5/325mg occasionally but not every day, this has been effective.  - Continue gabapentin 300mg qhs  - continue PRN Percocet 5/325mg as prescribed.   Instructed to contact our office with any new/worsening symptoms. Pt educated regarding plan of care, all questions/concerns addressed to the best of my abilities and his apparent satisfaction. F/u in 3wks for close monitoring at start of treatment with abiraterone + olaparib.

## 2024-11-07 NOTE — PHYSICAL EXAM
[Capable of only limited self care, confined to bed or chair more than 50% of waking hours] : Status 3- Capable of only limited self care, confined to bed or chair more than 50% of waking hours [Normal] : affect appropriate [de-identified] : anicteric  [de-identified] : no LE edema  [de-identified] : arrives by wheelchair today

## 2024-11-07 NOTE — HISTORY OF PRESENT ILLNESS
[de-identified] : Mr. Marti is a 62 yo M with PMHx of HTN, HLD, DM on metformin, chronic left shoulder/knee/hip joint pain; he had the initial medical oncology consultation for finding of left kidney on 3/1/24:  He initially started having inermittent left flank pain rated as 7/10 lasting several hours. He went to [facility] and admitted for 2 days (AMA on Feb 10th 2024 ); he had CT abd/p done there. He was told that he needs nephrectomy but he denied since "his mother had similar situation; she had nephrectomy but pathology was benign". He went to [facility] ER on 2/11/24 and had CT abd/p done. He was discharged from ER on the same day. He reports intermittent generalized fatigue, poor appetite for several months, and lost 30 lb in 8 months. He admits intermittent bone/muscle pain for several months. Denied hematuria, urine difficulty, fever, SOB at rest. He is able to walk 1-2 blocks but has   SOB on exertion.    -2/9/24 CT abd/p with IV contrast showing moderate left hydroureteronephrosis extending to the left distal ureter with a previous left ureteral mass. Findings suggestive of rupture of the left renal pelvis with prominent edema and fluid around the left kidney as well as delayed left nephrogram. Multiple enlarged retroperitoneal lymph nodes suspicious for metastases. Multiple scattered lytic and osseous sclerotic lesions concerning for metastatic disease.   -2/9/24 wbc 5.4 Hb 12.1 MCV 78.6% plt 266; K5.3 Cr 0.9 BUN 19 ; Ca 8.5 ALT 79; AST 67  albumin 3.3 Glucose 463.  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 Associated upstream mild hydroureteronephrosis. Extensive perinephric/periureteral stranding and fluid may reflect associated renal pelvic calyceal rupture as reported. Nonspecific renal pelvis and ureteral enhancement as well as bladder wall thickening. Recommend correlation with urinalysis for superimposed infection. Evidence of cheryl and osseous metastatic disease.  3/1/2024 patient had initial consultation and recommended for tissue biopsy, referred to urology. Percocet was prescribed for back pain.  3/1/24 CTA showed no pulmonary embolus, nonspecific bilateral hilar lymph nodes. diffuse skeletal metastases. 3/8/2024 Pt had Bone scan which shows widespread extensive skeletal metastasis.    Medication glipiz//metformin 5-500mg two tablets  bid  losartan one tablet once daily  jardiance 10mg daily   PSHx appendectomy >30-40 yrs ago SHx Denies ETOH/smoking. 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  FHx Denies family history of cancers/malignancies.  Allergy NKDA   3/8 Patient p/w an urgent visit. Son (Conner) and daughter(Ingrid) present. According to patient and family, patient has been having worsening pain, especially left lower back and pelvic pressure which causes left leg numbness. He was not able to walk properly for 3 days now.  Feels nausea and not able to eat well, constipated, groin pressure, difficulty urination. Percocet 5/325 does not improve his pain. plan to see urology (Dr. Jefferson) on 3/19 for tissue biopsy consultation.  3/25 he was admitted to hospital b/o rapidly worsening left flank, back pain and difficulty walking rapidly worsening left flank, back pain and difficulty walking  MRI spine showed cervical cord compression. Discussed to hold off surgery and follow with ortho closely. PSA elevated at 624. Urology consulted placed a stent in left ureter due to outside mass compression to the distal ureter, did not see any mass inside the left ureter. 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 Reports his left shoulder pain was resolved, left hip pain and lower back pain was improved. HIs dexamethasone was tapered off. He is on percocet 5-325 1tab every 88hours as needed and lidocaine topical film.  [de-identified] : prostate  [de-identified] : 4/19/24: Patient has b/l leg pain. He says he has had this pain since diagnosis, but feels it has worsened in the last week. He feels the pain when he walks, it is a throbbing sensation in his knees and calves. Patient endorses ongoing numbness in toes, no change from prior, says massage helps. Patient reports muscle twitching in arms and legs, family member endorses that they can visibly see muscles tensing.   Patient reports pain in L upper back near scapula, says it radiates around his axilla to his chest. This pain has been there for the past week. Patient feels the pain at rest and it is worse when he moves. On a scale of 1-10, the pain is rated as a 5, when he takes percocet it  improves to a 4..   Patient endorses shortness of breath on exertion, says this developed in the last three weeks, can walk 50-60 feet before becoming short of breath. He endorses palpitations on exertion. He denies cough and shortness of breath at rest. He denies chest pain.   After chemotherapy patient felt more tired for about the first week, after that he got back to his normal routine but has been doing things a bit slower. Says appetite is okay. Has not had fever or chills. No nausea or vomiting. Is slightly constipated, will sometimes go two days without having a BM. Denies sores in mouth and changes in taste. Denies rashes, does have dry skin. Reports urinary urgency, denies hematuria and dysuria.    5/29/24 reports random chill, hot flash and sweating and fatigue, improving his pain in the left upper back, bilateral legs and left pelvis. ALso states nausea, denies constipation and diarrhea. Also reports skin itchiness in both lower legs in last 4 weeks.   7/1/24 reports feeling better after completing a total of 4 cycles of chemotherapy. He reports hot flashes and sweating, shortness of breath, minor memory loss, pain in the left shoulder blade when moving around. He eats better now.   7/22/24 today is feeling well, Endorses nausea, no vomiting but with occasional constipation. He also notes pimple like cyst around the anus likely external hemorrhoids. He will increase fiber and fluids intake. In the event he is experiencing discomfort then he can use OTC witch hezel to help ease pain. Tolerating Tencentriq/ LUPRON but endorses sweating and cold alternation but tolerable.   8/14/24 pt is doing well, not in acute distress. Dependent on assistive device to get around [cane]. Endorses improvement in anal discomfort since increasing fiber and fluids. Urine flow is strong, and empties bladder completely. Appetite is good and maintaining weight. He continues to take one tablet of oxycodone due to chronic left shoulder pain, and b/l Knee joint pain [provides adequate relieve]  8/30/24 reports left shoulder, left shoulder blade and new pain in the left lower rib cage pain. Had normal appetite and energy level.   9/25/24 Pt returns today with c/o chronic left shoulder pain during movement but not at rest and denies dyspnea.  Appetite is fair and has since lost ~ 7 pounds. Pt advised to eat small frequent meals. Abdominal is soft, ND, + BS. No diarrhea or constipation. Urinary flow is strong and empties bladder completely. denies hematuria or urgency. Nocturia 5 x day, pt to reduce fluid intake during evening hours. He also endorses difficulty completing ADLs task due to shoulder pain.   10/16/24 yesterday devloped a fever 102 relieved by tylenol. So far he is afebrile. Reports multiple joint pain including bilateral hip, elbow and right shoulder. Eats btter. Reports hot flash and no sweating. He is chair bound.   11/6/25 - abiraterone started late Sept 2024 + olaparib started mid Oct 2024. Notes fatigue, with 2-3 naps a day which is stable since before start of abiraterone and olaparib. Ongoing hot flashes. Dizziness and nausea with standing for too long. Needs assistance with ambulation, dressing, bathing. Can walk short distances with a 1 person assist, does not use a walker as it doesn't fit in his house. Appetite is stable. Mild SOB with exertion, resolves with rest. Urine flow and urgency are improved, 3-4x/night. Ongoing pain of left scapula and L posterior ribs, bilateral shoulders, bilateral knees, bilateral ankles, somewhat improved, max pain is 5/10. Taking PRN Percocet 5/325mg occasionally but not every day.

## 2024-11-07 NOTE — REVIEW OF SYSTEMS
[Fatigue] : fatigue [SOB on Exertion] : shortness of breath during exertion [Diarrhea: Grade 0] : Diarrhea: Grade 0 [Joint Pain] : joint pain [Joint Stiffness] : joint stiffness [Muscle Pain] : muscle pain [Muscle Weakness] : muscle weakness [Dizziness] : dizziness [Hot Flashes] : hot flashes [Fever] : no fever [Chills] : no chills [Night Sweats] : no night sweats [Chest Pain] : no chest pain [Palpitations] : no palpitations [Lower Ext Edema] : no lower extremity edema [Cough] : no cough [Abdominal Pain] : no abdominal pain [Vomiting] : no vomiting [Constipation] : no constipation [Dysuria] : no dysuria [Incontinence] : no incontinence [Easy Bleeding] : no tendency for easy bleeding [Easy Bruising] : no tendency for easy bruising

## 2024-11-07 NOTE — PHYSICAL EXAM
[Capable of only limited self care, confined to bed or chair more than 50% of waking hours] : Status 3- Capable of only limited self care, confined to bed or chair more than 50% of waking hours [Normal] : affect appropriate [de-identified] : anicteric  [de-identified] : no LE edema  [de-identified] : arrives by wheelchair today

## 2024-12-02 NOTE — HISTORY OF PRESENT ILLNESS
[de-identified] : Mr. Marti is a 62 yo M with PMHx of HTN, HLD, DM on metformin, chronic left shoulder/knee/hip joint pain; he had the initial medical oncology consultation for finding of left kidney on 3/1/24:  He initially started having inermittent left flank pain rated as 7/10 lasting several hours. He went to [facility] and admitted for 2 days (AMA on Feb 10th 2024 ); he had CT abd/p done there. He was told that he needs nephrectomy but he denied since "his mother had similar situation; she had nephrectomy but pathology was benign". He went to [facility] ER on 2/11/24 and had CT abd/p done. He was discharged from ER on the same day. He reports intermittent generalized fatigue, poor appetite for several months, and lost 30 lb in 8 months. He admits intermittent bone/muscle pain for several months. Denied hematuria, urine difficulty, fever, SOB at rest. He is able to walk 1-2 blocks but has   SOB on exertion.    -2/9/24 CT abd/p with IV contrast showing moderate left hydroureteronephrosis extending to the left distal ureter with a previous left ureteral mass. Findings suggestive of rupture of the left renal pelvis with prominent edema and fluid around the left kidney as well as delayed left nephrogram. Multiple enlarged retroperitoneal lymph nodes suspicious for metastases. Multiple scattered lytic and osseous sclerotic lesions concerning for metastatic disease.   -2/9/24 wbc 5.4 Hb 12.1 MCV 78.6% plt 266; K5.3 Cr 0.9 BUN 19 ; Ca 8.5 ALT 79; AST 67  albumin 3.3 Glucose 463.  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 Associated upstream mild hydroureteronephrosis. Extensive perinephric/periureteral stranding and fluid may reflect associated renal pelvic calyceal rupture as reported. Nonspecific renal pelvis and ureteral enhancement as well as bladder wall thickening. Recommend correlation with urinalysis for superimposed infection. Evidence of cheryl and osseous metastatic disease.  3/1/2024 patient had initial consultation and recommended for tissue biopsy, referred to urology. Percocet was prescribed for back pain.  3/1/24 CTA showed no pulmonary embolus, nonspecific bilateral hilar lymph nodes. diffuse skeletal metastases. 3/8/2024 Pt had Bone scan which shows widespread extensive skeletal metastasis.    Medication glipiz//metformin 5-500mg two tablets  bid  losartan one tablet once daily  jardiance 10mg daily   PSHx appendectomy >30-40 yrs ago SHx Denies ETOH/smoking. 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  FHx Denies family history of cancers/malignancies.  Allergy NKDA   3/8 Patient p/w an urgent visit. Son (Conner) and daughter(Ingrid) present. According to patient and family, patient has been having worsening pain, especially left lower back and pelvic pressure which causes left leg numbness. He was not able to walk properly for 3 days now.  Feels nausea and not able to eat well, constipated, groin pressure, difficulty urination. Percocet 5/325 does not improve his pain. plan to see urology (Dr. Jefferson) on 3/19 for tissue biopsy consultation.  3/25 he was admitted to hospital b/o rapidly worsening left flank, back pain and difficulty walking rapidly worsening left flank, back pain and difficulty walking  MRI spine showed cervical cord compression. Discussed to hold off surgery and follow with ortho closely. PSA elevated at 624. Urology consulted placed a stent in left ureter due to outside mass compression to the distal ureter, did not see any mass inside the left ureter. 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 Reports his left shoulder pain was resolved, left hip pain and lower back pain was improved. HIs dexamethasone was tapered off. He is on percocet 5-325 1tab every 88hours as needed and lidocaine topical film.   4/19/24: Patient has b/l leg pain. He says he has had this pain since diagnosis, but feels it has worsened in the last week. He feels the pain when he walks, it is a throbbing sensation in his knees and calves. Patient endorses ongoing numbness in toes, no change from prior, says massage helps. Patient reports muscle twitching in arms and legs, family member endorses that they can visibly see muscles tensing.   Patient reports pain in L upper back near scapula, says it radiates around his axilla to his chest. This pain has been there for the past week. Patient feels the pain at rest and it is worse when he moves. On a scale of 1-10, the pain is rated as a 5, when he takes percocet it  improves to a 4..   Patient endorses shortness of breath on exertion, says this developed in the last three weeks, can walk 50-60 feet before becoming short of breath. He endorses palpitations on exertion. He denies cough and shortness of breath at rest. He denies chest pain.   After chemotherapy patient felt more tired for about the first week, after that he got back to his normal routine but has been doing things a bit slower. Says appetite is okay. Has not had fever or chills. No nausea or vomiting. Is slightly constipated, will sometimes go two days without having a BM. Denies sores in mouth and changes in taste. Denies rashes, does have dry skin. Reports urinary urgency, denies hematuria and dysuria.    5/29/24 reports random chill, hot flash and sweating and fatigue, improving his pain in the left upper back, bilateral legs and left pelvis. ALso states nausea, denies constipation and diarrhea. Also reports skin itchiness in both lower legs in last 4 weeks.   7/1/24 reports feeling better after completing a total of 4 cycles of chemotherapy. He reports hot flashes and sweating, shortness of breath, minor memory loss, pain in the left shoulder blade when moving around. He eats better now.   7/22/24 today is feeling well, Endorses nausea, no vomiting but with occasional constipation. He also notes pimple like cyst around the anus likely external hemorrhoids. He will increase fiber and fluids intake. In the event he is experiencing discomfort then he can use OTC witch hezel to help ease pain. Tolerating Tencentriq/ LUPRON but endorses sweating and cold alternation but tolerable.   8/14/24 pt is doing well, not in acute distress. Dependent on assistive device to get around [cane]. Endorses improvement in anal discomfort since increasing fiber and fluids. Urine flow is strong, and empties bladder completely. Appetite is good and maintaining weight. He continues to take one tablet of oxycodone due to chronic left shoulder pain, and b/l Knee joint pain [provides adequate relieve]  8/30/24 reports left shoulder, left shoulder blade and new pain in the left lower rib cage pain. Had normal appetite and energy level.   9/25/24 Pt returns today with c/o chronic left shoulder pain during movement but not at rest and denies dyspnea.  Appetite is fair and has since lost ~ 7 pounds. Pt advised to eat small frequent meals. Abdominal is soft, ND, + BS. No diarrhea or constipation. Urinary flow is strong and empties bladder completely. denies hematuria or urgency. Nocturia 5 x day, pt to reduce fluid intake during evening hours. He also endorses difficulty completing ADLs task due to shoulder pain.   10/16/24 yesterday devloped a fever 102 relieved by tylenol. So far he is afebrile. Reports multiple joint pain including bilateral hip, elbow and right shoulder. Eats btter. Reports hot flash and no sweating. He is chair bound.   11/6/25 - abiraterone started late Sept 2024 + olaparib started mid Oct 2024. Notes fatigue, with 2-3 naps a day which is stable since before start of abiraterone and olaparib. Ongoing hot flashes. Dizziness and nausea with standing for too long. Needs assistance with ambulation, dressing, bathing. Can walk short distances with a 1 person assist, does not use a walker as it doesn't fit in his house. Appetite is stable. Mild SOB with exertion, resolves with rest. Urine flow and urgency are improved, 3-4x/night. Ongoing pain of left scapula and L posterior ribs, bilateral shoulders, bilateral knees, bilateral ankles, somewhat improved, max pain is 5/10. Taking PRN Percocet 5/325mg occasionally but not every day.   [de-identified] : prostate  [de-identified] : 11/26/24 - abiraterone started late Sept 2024 + olaparib started mid Oct 2024. Ongoing fatigue is somewhat worse, can walk a couple feet with a one person assist before needing to stop and rest. Occasional dizziness with standing. Appetite is improved. Coughing for the past week. Urine flow is "good", urgency at times, 3-4x/night. Notes increased pain in the joints (shoulders, elbows, hips, knees, ankles). Notes a shock like pain in the left upper back/scapula and rib/flank area. All pains are worse with movement and improves with lying down. Max pain is 7/10 with movement. Takes PRN Tylenol for mild pain and Percocet 5-325mg 1 tab per day with good effect. Denies fever, chills, night sweats, hot flashes, headache, chest pain, palpitations, SOB, nausea/vomiting, diarrhea/constipation, abdominal pain, dysuria, hematuria, incontinence, LE edema, bleeding.

## 2024-12-02 NOTE — REVIEW OF SYSTEMS
[Fatigue] : fatigue [Cough] : cough [Diarrhea: Grade 0] : Diarrhea: Grade 0 [Joint Pain] : joint pain [Joint Stiffness] : joint stiffness [Muscle Pain] : muscle pain [Muscle Weakness] : muscle weakness [Dizziness] : dizziness [Fever] : no fever [Chills] : no chills [Night Sweats] : no night sweats [Chest Pain] : no chest pain [Palpitations] : no palpitations [Lower Ext Edema] : no lower extremity edema [Shortness Of Breath] : no shortness of breath [Abdominal Pain] : no abdominal pain [Vomiting] : no vomiting [Constipation] : no constipation [Dysuria] : no dysuria [Incontinence] : no incontinence [Hot Flashes] : no hot flashes [Easy Bleeding] : no tendency for easy bleeding [Easy Bruising] : no tendency for easy bruising

## 2024-12-02 NOTE — ASSESSMENT
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   In March 2024 he was admitted for worsening back pain and difficulty walking.   MRI spine showed cervical cord compression. Discussed to hold off surgery and follow with ortho closely. PSA elevated at 624. Urology consulted placed a stent in left ureter due to outside mass compression to the distal ureter, did not see any mass inside the left ureter. 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   On 4/1/24 pt was started on carbo/etoposide/tecentriq, plan for 4 cycles. Received first dose of lupron 4/3/24.  5/28/24 DEXA scan showed normal density.   9/10/24 CT CAP: Diffuse metastatic disease of the bones is markedly increased since prior exams. Previously noted enlarged hilar lymph nodes are now normal in size. Delayed left nephrogram with mild to moderate left hydroureter and hydronephrosis as well as some mucosal hyperemia (of infectious or inflammatory etiology). Previously present left perinephric stranding has markedly decreased.  9/10/24 NM Bone Scan: Compared to the bone scan in 3/2024, there is overall less intense and less heterogeneous foci of increased activity in the skeleton suggesting partial favorable response to therapy.   Plan   Prostate neuroendocrine adenocarcinoma: - Continue Lupron [id number], last given 10/19/24, next scheduled for 1/16/25.  - Foundation tissue Dx: BRCA2 loss, BRIP *, myc amplification, Rb loss and p53 mutation - Continue abiraterone 1,000mg daily + prednisone 5mg daily and olaparib 300mg bid. Reviewed potential AEs not limited to hypertension, abnormal liver function, edema, arthralgia, diarrhea, skin rash, hyperglycemia, cytopenias, fatigue, nausea/vomiting, decreased appetite.  - PSA lennie rapidly from 57.3 on 9/3/24 to 159 on 9/25/24. Most recently PSA lennie to 359 on 11/6/24. Repeat PSA today is pending. I am concerned about his rapidly rising PSA and worsening pains likely represent disease progression. He has only been on this combination therapy for about 1 month. Ideally, we would wait until after 3 months of therapy before obtaining scans however if his PSA continues to rise rapidly, we may consider repeat imaging given concern for clinical progression. Will discuss with Dr. Vergara once PSA results.   Bone mets: - Dexa scan on 5/29/24 reports normal bone density. 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 Potential side effects reviewed including but not limited to hypocalcemia and ONJ. Instructed to obtain dental clearance prior to start of Xgeva, this is still pending.   Pain  - Notes increased pain in the joints (shoulders, elbows, hips, knees, ankles). Notes a shock like pain in the left upper back/scapula and rib/flank area. All pains are worse with movement and improve with lying down. Max pain is 7/10 with movement. Takes PRN Tylenol for mild pain and Percocet 5-325mg 1 tab at bedtime with good effect. - Continue gabapentin 300mg qhs  - Continue PRN Tylenol per package insert. I have refilled for just PRN oxycodone 5mg q6hrs for moderate to severe pain. Bowel regimen in place.   Instructed to contact our office with any new/worsening symptoms. Pt educated regarding plan of care, all questions/concerns addressed to the best of my abilities and his apparent satisfaction. F/u in 3wks for close monitoring at start of treatment with abiraterone + olaparib.   ***ADDENDUM*** 11/26 PSA down to 219 from 359 on 11/6. Chromogranin A is down to 263 from 521 on 11/6. Per d/w Dr. Vergara, will continue abiraterone + olaparib as prescribed and reassess at next visit. Pt's son Conner is in agreement with this plan.

## 2024-12-02 NOTE — HISTORY OF PRESENT ILLNESS
[de-identified] : Mr. Marti is a 62 yo M with PMHx of HTN, HLD, DM on metformin, chronic left shoulder/knee/hip joint pain; he had the initial medical oncology consultation for finding of left kidney on 3/1/24:  He initially started having inermittent left flank pain rated as 7/10 lasting several hours. He went to [facility] and admitted for 2 days (AMA on Feb 10th 2024 ); he had CT abd/p done there. He was told that he needs nephrectomy but he denied since "his mother had similar situation; she had nephrectomy but pathology was benign". He went to [facility] ER on 2/11/24 and had CT abd/p done. He was discharged from ER on the same day. He reports intermittent generalized fatigue, poor appetite for several months, and lost 30 lb in 8 months. He admits intermittent bone/muscle pain for several months. Denied hematuria, urine difficulty, fever, SOB at rest. He is able to walk 1-2 blocks but has   SOB on exertion.    -2/9/24 CT abd/p with IV contrast showing moderate left hydroureteronephrosis extending to the left distal ureter with a previous left ureteral mass. Findings suggestive of rupture of the left renal pelvis with prominent edema and fluid around the left kidney as well as delayed left nephrogram. Multiple enlarged retroperitoneal lymph nodes suspicious for metastases. Multiple scattered lytic and osseous sclerotic lesions concerning for metastatic disease.   -2/9/24 wbc 5.4 Hb 12.1 MCV 78.6% plt 266; K5.3 Cr 0.9 BUN 19 ; Ca 8.5 ALT 79; AST 67  albumin 3.3 Glucose 463.  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 Associated upstream mild hydroureteronephrosis. Extensive perinephric/periureteral stranding and fluid may reflect associated renal pelvic calyceal rupture as reported. Nonspecific renal pelvis and ureteral enhancement as well as bladder wall thickening. Recommend correlation with urinalysis for superimposed infection. Evidence of cheryl and osseous metastatic disease.  3/1/2024 patient had initial consultation and recommended for tissue biopsy, referred to urology. Percocet was prescribed for back pain.  3/1/24 CTA showed no pulmonary embolus, nonspecific bilateral hilar lymph nodes. diffuse skeletal metastases. 3/8/2024 Pt had Bone scan which shows widespread extensive skeletal metastasis.    Medication glipiz//metformin 5-500mg two tablets  bid  losartan one tablet once daily  jardiance 10mg daily   PSHx appendectomy >30-40 yrs ago SHx Denies ETOH/smoking. 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  FHx Denies family history of cancers/malignancies.  Allergy NKDA   3/8 Patient p/w an urgent visit. Son (Conner) and daughter(Ingrid) present. According to patient and family, patient has been having worsening pain, especially left lower back and pelvic pressure which causes left leg numbness. He was not able to walk properly for 3 days now.  Feels nausea and not able to eat well, constipated, groin pressure, difficulty urination. Percocet 5/325 does not improve his pain. plan to see urology (Dr. Jefferson) on 3/19 for tissue biopsy consultation.  3/25 he was admitted to hospital b/o rapidly worsening left flank, back pain and difficulty walking rapidly worsening left flank, back pain and difficulty walking  MRI spine showed cervical cord compression. Discussed to hold off surgery and follow with ortho closely. PSA elevated at 624. Urology consulted placed a stent in left ureter due to outside mass compression to the distal ureter, did not see any mass inside the left ureter. 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 Reports his left shoulder pain was resolved, left hip pain and lower back pain was improved. HIs dexamethasone was tapered off. He is on percocet 5-325 1tab every 88hours as needed and lidocaine topical film.   4/19/24: Patient has b/l leg pain. He says he has had this pain since diagnosis, but feels it has worsened in the last week. He feels the pain when he walks, it is a throbbing sensation in his knees and calves. Patient endorses ongoing numbness in toes, no change from prior, says massage helps. Patient reports muscle twitching in arms and legs, family member endorses that they can visibly see muscles tensing.   Patient reports pain in L upper back near scapula, says it radiates around his axilla to his chest. This pain has been there for the past week. Patient feels the pain at rest and it is worse when he moves. On a scale of 1-10, the pain is rated as a 5, when he takes percocet it  improves to a 4..   Patient endorses shortness of breath on exertion, says this developed in the last three weeks, can walk 50-60 feet before becoming short of breath. He endorses palpitations on exertion. He denies cough and shortness of breath at rest. He denies chest pain.   After chemotherapy patient felt more tired for about the first week, after that he got back to his normal routine but has been doing things a bit slower. Says appetite is okay. Has not had fever or chills. No nausea or vomiting. Is slightly constipated, will sometimes go two days without having a BM. Denies sores in mouth and changes in taste. Denies rashes, does have dry skin. Reports urinary urgency, denies hematuria and dysuria.    5/29/24 reports random chill, hot flash and sweating and fatigue, improving his pain in the left upper back, bilateral legs and left pelvis. ALso states nausea, denies constipation and diarrhea. Also reports skin itchiness in both lower legs in last 4 weeks.   7/1/24 reports feeling better after completing a total of 4 cycles of chemotherapy. He reports hot flashes and sweating, shortness of breath, minor memory loss, pain in the left shoulder blade when moving around. He eats better now.   7/22/24 today is feeling well, Endorses nausea, no vomiting but with occasional constipation. He also notes pimple like cyst around the anus likely external hemorrhoids. He will increase fiber and fluids intake. In the event he is experiencing discomfort then he can use OTC witch hezel to help ease pain. Tolerating Tencentriq/ LUPRON but endorses sweating and cold alternation but tolerable.   8/14/24 pt is doing well, not in acute distress. Dependent on assistive device to get around [cane]. Endorses improvement in anal discomfort since increasing fiber and fluids. Urine flow is strong, and empties bladder completely. Appetite is good and maintaining weight. He continues to take one tablet of oxycodone due to chronic left shoulder pain, and b/l Knee joint pain [provides adequate relieve]  8/30/24 reports left shoulder, left shoulder blade and new pain in the left lower rib cage pain. Had normal appetite and energy level.   9/25/24 Pt returns today with c/o chronic left shoulder pain during movement but not at rest and denies dyspnea.  Appetite is fair and has since lost ~ 7 pounds. Pt advised to eat small frequent meals. Abdominal is soft, ND, + BS. No diarrhea or constipation. Urinary flow is strong and empties bladder completely. denies hematuria or urgency. Nocturia 5 x day, pt to reduce fluid intake during evening hours. He also endorses difficulty completing ADLs task due to shoulder pain.   10/16/24 yesterday devloped a fever 102 relieved by tylenol. So far he is afebrile. Reports multiple joint pain including bilateral hip, elbow and right shoulder. Eats btter. Reports hot flash and no sweating. He is chair bound.   11/6/25 - abiraterone started late Sept 2024 + olaparib started mid Oct 2024. Notes fatigue, with 2-3 naps a day which is stable since before start of abiraterone and olaparib. Ongoing hot flashes. Dizziness and nausea with standing for too long. Needs assistance with ambulation, dressing, bathing. Can walk short distances with a 1 person assist, does not use a walker as it doesn't fit in his house. Appetite is stable. Mild SOB with exertion, resolves with rest. Urine flow and urgency are improved, 3-4x/night. Ongoing pain of left scapula and L posterior ribs, bilateral shoulders, bilateral knees, bilateral ankles, somewhat improved, max pain is 5/10. Taking PRN Percocet 5/325mg occasionally but not every day.   [de-identified] : prostate  [de-identified] : 11/26/24 - abiraterone started late Sept 2024 + olaparib started mid Oct 2024. Ongoing fatigue is somewhat worse, can walk a couple feet with a one person assist before needing to stop and rest. Occasional dizziness with standing. Appetite is improved. Coughing for the past week. Urine flow is "good", urgency at times, 3-4x/night. Notes increased pain in the joints (shoulders, elbows, hips, knees, ankles). Notes a shock like pain in the left upper back/scapula and rib/flank area. All pains are worse with movement and improves with lying down. Max pain is 7/10 with movement. Takes PRN Tylenol for mild pain and Percocet 5-325mg 1 tab per day with good effect. Denies fever, chills, night sweats, hot flashes, headache, chest pain, palpitations, SOB, nausea/vomiting, diarrhea/constipation, abdominal pain, dysuria, hematuria, incontinence, LE edema, bleeding.

## 2024-12-02 NOTE — PHYSICAL EXAM
[Capable of only limited self care, confined to bed or chair more than 50% of waking hours] : Status 3- Capable of only limited self care, confined to bed or chair more than 50% of waking hours [Normal] : affect appropriate [de-identified] : anicteric  [de-identified] : no LE edema  [de-identified] : +BS; mild LLQ pain with palpation, no palpable masses or abnormalities though exam limited as pt in wheelchair [de-identified] : mild pain with palpation of lumbar spine [de-identified] : arrives by wheelchair, moves extremities freely

## 2024-12-02 NOTE — PHYSICAL EXAM
[Capable of only limited self care, confined to bed or chair more than 50% of waking hours] : Status 3- Capable of only limited self care, confined to bed or chair more than 50% of waking hours [Normal] : affect appropriate [de-identified] : anicteric  [de-identified] : no LE edema  [de-identified] : +BS; mild LLQ pain with palpation, no palpable masses or abnormalities though exam limited as pt in wheelchair [de-identified] : mild pain with palpation of lumbar spine [de-identified] : arrives by wheelchair, moves extremities freely

## 2024-12-02 NOTE — ASSESSMENT
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   In March 2024 he was admitted for worsening back pain and difficulty walking.   MRI spine showed cervical cord compression. Discussed to hold off surgery and follow with ortho closely. PSA elevated at 624. Urology consulted placed a stent in left ureter due to outside mass compression to the distal ureter, did not see any mass inside the left ureter. 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   On 4/1/24 pt was started on carbo/etoposide/tecentriq, plan for 4 cycles. Received first dose of lupron 4/3/24.  5/28/24 DEXA scan showed normal density.   9/10/24 CT CAP: Diffuse metastatic disease of the bones is markedly increased since prior exams. Previously noted enlarged hilar lymph nodes are now normal in size. Delayed left nephrogram with mild to moderate left hydroureter and hydronephrosis as well as some mucosal hyperemia (of infectious or inflammatory etiology). Previously present left perinephric stranding has markedly decreased.  9/10/24 NM Bone Scan: Compared to the bone scan in 3/2024, there is overall less intense and less heterogeneous foci of increased activity in the skeleton suggesting partial favorable response to therapy.   Plan   Prostate neuroendocrine adenocarcinoma: - Continue Lupron [id number], last given 10/19/24, next scheduled for 1/16/25.  - Foundation tissue Dx: BRCA2 loss, BRIP *, myc amplification, Rb loss and p53 mutation - Continue abiraterone 1,000mg daily + prednisone 5mg daily and olaparib 300mg bid. Reviewed potential AEs not limited to hypertension, abnormal liver function, edema, arthralgia, diarrhea, skin rash, hyperglycemia, cytopenias, fatigue, nausea/vomiting, decreased appetite.  - PSA lennie rapidly from 57.3 on 9/3/24 to 159 on 9/25/24. Most recently PSA lennie to 359 on 11/6/24. Repeat PSA today is pending. I am concerned about his rapidly rising PSA and worsening pains likely represent disease progression. He has only been on this combination therapy for about 1 month. Ideally, we would wait until after 3 months of therapy before obtaining scans however if his PSA continues to rise rapidly, we may consider repeat imaging given concern for clinical progression. Will discuss with Dr. Vergara once PSA results.   Bone mets: - Dexa scan on 5/29/24 reports normal bone density. 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 Potential side effects reviewed including but not limited to hypocalcemia and ONJ. Instructed to obtain dental clearance prior to start of Xgeva, this is still pending.   Pain  - Notes increased pain in the joints (shoulders, elbows, hips, knees, ankles). Notes a shock like pain in the left upper back/scapula and rib/flank area. All pains are worse with movement and improve with lying down. Max pain is 7/10 with movement. Takes PRN Tylenol for mild pain and Percocet 5-325mg 1 tab at bedtime with good effect. - Continue gabapentin 300mg qhs  - Continue PRN Tylenol per package insert. I have refilled for just PRN oxycodone 5mg q6hrs for moderate to severe pain. Bowel regimen in place.   Instructed to contact our office with any new/worsening symptoms. Pt educated regarding plan of care, all questions/concerns addressed to the best of my abilities and his apparent satisfaction. F/u in 3wks for close monitoring at start of treatment with abiraterone + olaparib.   ***ADDENDUM*** 11/26 PSA down to 219 from 359 on 11/6. Chromogranin A is down to 263 from 521 on 11/6. Per d/w Dr. Vergara, will continue abiraterone + olaparib as prescribed and reassess at next visit. Pt's son Conner is in agreement with this plan.

## 2024-12-02 NOTE — ASSESSMENT
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   In March 2024 he was admitted for worsening back pain and difficulty walking.   MRI spine showed cervical cord compression. Discussed to hold off surgery and follow with ortho closely. PSA elevated at 624. Urology consulted placed a stent in left ureter due to outside mass compression to the distal ureter, did not see any mass inside the left ureter. 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   On 4/1/24 pt was started on carbo/etoposide/tecentriq, plan for 4 cycles. Received first dose of lupron 4/3/24.  5/28/24 DEXA scan showed normal density.   9/10/24 CT CAP: Diffuse metastatic disease of the bones is markedly increased since prior exams. Previously noted enlarged hilar lymph nodes are now normal in size. Delayed left nephrogram with mild to moderate left hydroureter and hydronephrosis as well as some mucosal hyperemia (of infectious or inflammatory etiology). Previously present left perinephric stranding has markedly decreased.  9/10/24 NM Bone Scan: Compared to the bone scan in 3/2024, there is overall less intense and less heterogeneous foci of increased activity in the skeleton suggesting partial favorable response to therapy.   Plan   Prostate neuroendocrine adenocarcinoma: - Continue Lupron [id number], last given 10/19/24, next scheduled for 1/16/25.  - Foundation tissue Dx: BRCA2 loss, BRIP *, myc amplification, Rb loss and p53 mutation - Continue abiraterone 1,000mg daily + prednisone 5mg daily and olaparib 300mg bid. Reviewed potential AEs not limited to hypertension, abnormal liver function, edema, arthralgia, diarrhea, skin rash, hyperglycemia, cytopenias, fatigue, nausea/vomiting, decreased appetite.  - PSA lennie rapidly from 57.3 on 9/3/24 to 159 on 9/25/24. Most recently PSA lennie to 359 on 11/6/24. Repeat PSA today is pending. I am concerned about his rapidly rising PSA and worsening pains likely represent disease progression. He has only been on this combination therapy for about 1 month. Ideally, we would wait until after 3 months of therapy before obtaining scans however if his PSA continues to rise rapidly, we may consider repeat imaging given concern for clinical progression. Will discuss with Dr. Vergara once PSA results.   Bone mets: - Dexa scan on 5/29/24 reports normal bone density. 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 Potential side effects reviewed including but not limited to hypocalcemia and ONJ. Instructed to obtain dental clearance prior to start of Xgeva, this is still pending.   Pain  - Notes increased pain in the joints (shoulders, elbows, hips, knees, ankles). Notes a shock like pain in the left upper back/scapula and rib/flank area. All pains are worse with movement and improve with lying down. Max pain is 7/10 with movement. Takes PRN Tylenol for mild pain and Percocet 5-325mg 1 tab at bedtime with good effect. - Continue gabapentin 300mg qhs  - Continue PRN Tylenol per package insert. I have refilled for just PRN oxycodone 5mg q6hrs for moderate to severe pain. Bowel regimen in place.   Instructed to contact our office with any new/worsening symptoms. Pt educated regarding plan of care, all questions/concerns addressed to the best of my abilities and his apparent satisfaction. F/u in 3wks for close monitoring at start of treatment with abiraterone + olaparib.   ***ADDENDUM*** 11/26 PSA down to 219 from 359 on 11/6. Chromogranin A is down to 263 from 521 on 11/6. Per d/w Dr. Vergara, will continue abiraterone + olaparib as prescribed and reassess at next visit. Pt's son Conner is in agreement with this plan.

## 2024-12-02 NOTE — PHYSICAL EXAM
[Capable of only limited self care, confined to bed or chair more than 50% of waking hours] : Status 3- Capable of only limited self care, confined to bed or chair more than 50% of waking hours [Normal] : affect appropriate [de-identified] : anicteric  [de-identified] : no LE edema  [de-identified] : +BS; mild LLQ pain with palpation, no palpable masses or abnormalities though exam limited as pt in wheelchair [de-identified] : mild pain with palpation of lumbar spine [de-identified] : arrives by wheelchair, moves extremities freely

## 2024-12-02 NOTE — HISTORY OF PRESENT ILLNESS
[de-identified] : Mr. Marti is a 62 yo M with PMHx of HTN, HLD, DM on metformin, chronic left shoulder/knee/hip joint pain; he had the initial medical oncology consultation for finding of left kidney on 3/1/24:  He initially started having inermittent left flank pain rated as 7/10 lasting several hours. He went to [facility] and admitted for 2 days (AMA on Feb 10th 2024 ); he had CT abd/p done there. He was told that he needs nephrectomy but he denied since "his mother had similar situation; she had nephrectomy but pathology was benign". He went to [facility] ER on 2/11/24 and had CT abd/p done. He was discharged from ER on the same day. He reports intermittent generalized fatigue, poor appetite for several months, and lost 30 lb in 8 months. He admits intermittent bone/muscle pain for several months. Denied hematuria, urine difficulty, fever, SOB at rest. He is able to walk 1-2 blocks but has   SOB on exertion.    -2/9/24 CT abd/p with IV contrast showing moderate left hydroureteronephrosis extending to the left distal ureter with a previous left ureteral mass. Findings suggestive of rupture of the left renal pelvis with prominent edema and fluid around the left kidney as well as delayed left nephrogram. Multiple enlarged retroperitoneal lymph nodes suspicious for metastases. Multiple scattered lytic and osseous sclerotic lesions concerning for metastatic disease.   -2/9/24 wbc 5.4 Hb 12.1 MCV 78.6% plt 266; K5.3 Cr 0.9 BUN 19 ; Ca 8.5 ALT 79; AST 67  albumin 3.3 Glucose 463.  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 Associated upstream mild hydroureteronephrosis. Extensive perinephric/periureteral stranding and fluid may reflect associated renal pelvic calyceal rupture as reported. Nonspecific renal pelvis and ureteral enhancement as well as bladder wall thickening. Recommend correlation with urinalysis for superimposed infection. Evidence of cheryl and osseous metastatic disease.  3/1/2024 patient had initial consultation and recommended for tissue biopsy, referred to urology. Percocet was prescribed for back pain.  3/1/24 CTA showed no pulmonary embolus, nonspecific bilateral hilar lymph nodes. diffuse skeletal metastases. 3/8/2024 Pt had Bone scan which shows widespread extensive skeletal metastasis.    Medication glipiz//metformin 5-500mg two tablets  bid  losartan one tablet once daily  jardiance 10mg daily   PSHx appendectomy >30-40 yrs ago SHx Denies ETOH/smoking. 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  FHx Denies family history of cancers/malignancies.  Allergy NKDA   3/8 Patient p/w an urgent visit. Son (Conner) and daughter(Ingrid) present. According to patient and family, patient has been having worsening pain, especially left lower back and pelvic pressure which causes left leg numbness. He was not able to walk properly for 3 days now.  Feels nausea and not able to eat well, constipated, groin pressure, difficulty urination. Percocet 5/325 does not improve his pain. plan to see urology (Dr. Jefferson) on 3/19 for tissue biopsy consultation.  3/25 he was admitted to hospital b/o rapidly worsening left flank, back pain and difficulty walking rapidly worsening left flank, back pain and difficulty walking  MRI spine showed cervical cord compression. Discussed to hold off surgery and follow with ortho closely. PSA elevated at 624. Urology consulted placed a stent in left ureter due to outside mass compression to the distal ureter, did not see any mass inside the left ureter. 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 Reports his left shoulder pain was resolved, left hip pain and lower back pain was improved. HIs dexamethasone was tapered off. He is on percocet 5-325 1tab every 88hours as needed and lidocaine topical film.   4/19/24: Patient has b/l leg pain. He says he has had this pain since diagnosis, but feels it has worsened in the last week. He feels the pain when he walks, it is a throbbing sensation in his knees and calves. Patient endorses ongoing numbness in toes, no change from prior, says massage helps. Patient reports muscle twitching in arms and legs, family member endorses that they can visibly see muscles tensing.   Patient reports pain in L upper back near scapula, says it radiates around his axilla to his chest. This pain has been there for the past week. Patient feels the pain at rest and it is worse when he moves. On a scale of 1-10, the pain is rated as a 5, when he takes percocet it  improves to a 4..   Patient endorses shortness of breath on exertion, says this developed in the last three weeks, can walk 50-60 feet before becoming short of breath. He endorses palpitations on exertion. He denies cough and shortness of breath at rest. He denies chest pain.   After chemotherapy patient felt more tired for about the first week, after that he got back to his normal routine but has been doing things a bit slower. Says appetite is okay. Has not had fever or chills. No nausea or vomiting. Is slightly constipated, will sometimes go two days without having a BM. Denies sores in mouth and changes in taste. Denies rashes, does have dry skin. Reports urinary urgency, denies hematuria and dysuria.    5/29/24 reports random chill, hot flash and sweating and fatigue, improving his pain in the left upper back, bilateral legs and left pelvis. ALso states nausea, denies constipation and diarrhea. Also reports skin itchiness in both lower legs in last 4 weeks.   7/1/24 reports feeling better after completing a total of 4 cycles of chemotherapy. He reports hot flashes and sweating, shortness of breath, minor memory loss, pain in the left shoulder blade when moving around. He eats better now.   7/22/24 today is feeling well, Endorses nausea, no vomiting but with occasional constipation. He also notes pimple like cyst around the anus likely external hemorrhoids. He will increase fiber and fluids intake. In the event he is experiencing discomfort then he can use OTC witch hezel to help ease pain. Tolerating Tencentriq/ LUPRON but endorses sweating and cold alternation but tolerable.   8/14/24 pt is doing well, not in acute distress. Dependent on assistive device to get around [cane]. Endorses improvement in anal discomfort since increasing fiber and fluids. Urine flow is strong, and empties bladder completely. Appetite is good and maintaining weight. He continues to take one tablet of oxycodone due to chronic left shoulder pain, and b/l Knee joint pain [provides adequate relieve]  8/30/24 reports left shoulder, left shoulder blade and new pain in the left lower rib cage pain. Had normal appetite and energy level.   9/25/24 Pt returns today with c/o chronic left shoulder pain during movement but not at rest and denies dyspnea.  Appetite is fair and has since lost ~ 7 pounds. Pt advised to eat small frequent meals. Abdominal is soft, ND, + BS. No diarrhea or constipation. Urinary flow is strong and empties bladder completely. denies hematuria or urgency. Nocturia 5 x day, pt to reduce fluid intake during evening hours. He also endorses difficulty completing ADLs task due to shoulder pain.   10/16/24 yesterday devloped a fever 102 relieved by tylenol. So far he is afebrile. Reports multiple joint pain including bilateral hip, elbow and right shoulder. Eats btter. Reports hot flash and no sweating. He is chair bound.   11/6/25 - abiraterone started late Sept 2024 + olaparib started mid Oct 2024. Notes fatigue, with 2-3 naps a day which is stable since before start of abiraterone and olaparib. Ongoing hot flashes. Dizziness and nausea with standing for too long. Needs assistance with ambulation, dressing, bathing. Can walk short distances with a 1 person assist, does not use a walker as it doesn't fit in his house. Appetite is stable. Mild SOB with exertion, resolves with rest. Urine flow and urgency are improved, 3-4x/night. Ongoing pain of left scapula and L posterior ribs, bilateral shoulders, bilateral knees, bilateral ankles, somewhat improved, max pain is 5/10. Taking PRN Percocet 5/325mg occasionally but not every day.   [de-identified] : prostate  [de-identified] : 11/26/24 - abiraterone started late Sept 2024 + olaparib started mid Oct 2024. Ongoing fatigue is somewhat worse, can walk a couple feet with a one person assist before needing to stop and rest. Occasional dizziness with standing. Appetite is improved. Coughing for the past week. Urine flow is "good", urgency at times, 3-4x/night. Notes increased pain in the joints (shoulders, elbows, hips, knees, ankles). Notes a shock like pain in the left upper back/scapula and rib/flank area. All pains are worse with movement and improves with lying down. Max pain is 7/10 with movement. Takes PRN Tylenol for mild pain and Percocet 5-325mg 1 tab per day with good effect. Denies fever, chills, night sweats, hot flashes, headache, chest pain, palpitations, SOB, nausea/vomiting, diarrhea/constipation, abdominal pain, dysuria, hematuria, incontinence, LE edema, bleeding.

## 2024-12-02 NOTE — PHYSICAL EXAM
[Capable of only limited self care, confined to bed or chair more than 50% of waking hours] : Status 3- Capable of only limited self care, confined to bed or chair more than 50% of waking hours [Normal] : affect appropriate [de-identified] : anicteric  [de-identified] : no LE edema  [de-identified] : +BS; mild LLQ pain with palpation, no palpable masses or abnormalities though exam limited as pt in wheelchair [de-identified] : mild pain with palpation of lumbar spine [de-identified] : arrives by wheelchair, moves extremities freely

## 2024-12-02 NOTE — ASSESSMENT
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   In March 2024 he was admitted for worsening back pain and difficulty walking.   MRI spine showed cervical cord compression. Discussed to hold off surgery and follow with ortho closely. PSA elevated at 624. Urology consulted placed a stent in left ureter due to outside mass compression to the distal ureter, did not see any mass inside the left ureter. 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   On 4/1/24 pt was started on carbo/etoposide/tecentriq, plan for 4 cycles. Received first dose of lupron 4/3/24.  5/28/24 DEXA scan showed normal density.   9/10/24 CT CAP: Diffuse metastatic disease of the bones is markedly increased since prior exams. Previously noted enlarged hilar lymph nodes are now normal in size. Delayed left nephrogram with mild to moderate left hydroureter and hydronephrosis as well as some mucosal hyperemia (of infectious or inflammatory etiology). Previously present left perinephric stranding has markedly decreased.  9/10/24 NM Bone Scan: Compared to the bone scan in 3/2024, there is overall less intense and less heterogeneous foci of increased activity in the skeleton suggesting partial favorable response to therapy.   Plan   Prostate neuroendocrine adenocarcinoma: - Continue Lupron [id number], last given 10/19/24, next scheduled for 1/16/25.  - Foundation tissue Dx: BRCA2 loss, BRIP *, myc amplification, Rb loss and p53 mutation - Continue abiraterone 1,000mg daily + prednisone 5mg daily and olaparib 300mg bid. Reviewed potential AEs not limited to hypertension, abnormal liver function, edema, arthralgia, diarrhea, skin rash, hyperglycemia, cytopenias, fatigue, nausea/vomiting, decreased appetite.  - PSA lennie rapidly from 57.3 on 9/3/24 to 159 on 9/25/24. Most recently PSA lennie to 359 on 11/6/24. Repeat PSA today is pending. I am concerned about his rapidly rising PSA and worsening pains likely represent disease progression. He has only been on this combination therapy for about 1 month. Ideally, we would wait until after 3 months of therapy before obtaining scans however if his PSA continues to rise rapidly, we may consider repeat imaging given concern for clinical progression. Will discuss with Dr. Vergara once PSA results.   Bone mets: - Dexa scan on 5/29/24 reports normal bone density. 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 Potential side effects reviewed including but not limited to hypocalcemia and ONJ. Instructed to obtain dental clearance prior to start of Xgeva, this is still pending.   Pain  - Notes increased pain in the joints (shoulders, elbows, hips, knees, ankles). Notes a shock like pain in the left upper back/scapula and rib/flank area. All pains are worse with movement and improve with lying down. Max pain is 7/10 with movement. Takes PRN Tylenol for mild pain and Percocet 5-325mg 1 tab at bedtime with good effect. - Continue gabapentin 300mg qhs  - Continue PRN Tylenol per package insert. I have refilled for just PRN oxycodone 5mg q6hrs for moderate to severe pain. Bowel regimen in place.   Instructed to contact our office with any new/worsening symptoms. Pt educated regarding plan of care, all questions/concerns addressed to the best of my abilities and his apparent satisfaction. F/u in 3wks for close monitoring at start of treatment with abiraterone + olaparib.   ***ADDENDUM*** 11/26 PSA down to 219 from 359 on 11/6. Chromogranin A is down to 263 from 521 on 11/6. Per d/w Dr. Vergara, will continue abiraterone + olaparib as prescribed and reassess at next visit. Pt's son Conner is in agreement with this plan.

## 2024-12-02 NOTE — HISTORY OF PRESENT ILLNESS
[de-identified] : Mr. Marti is a 62 yo M with PMHx of HTN, HLD, DM on metformin, chronic left shoulder/knee/hip joint pain; he had the initial medical oncology consultation for finding of left kidney on 3/1/24:  He initially started having inermittent left flank pain rated as 7/10 lasting several hours. He went to [facility] and admitted for 2 days (AMA on Feb 10th 2024 ); he had CT abd/p done there. He was told that he needs nephrectomy but he denied since "his mother had similar situation; she had nephrectomy but pathology was benign". He went to [facility] ER on 2/11/24 and had CT abd/p done. He was discharged from ER on the same day. He reports intermittent generalized fatigue, poor appetite for several months, and lost 30 lb in 8 months. He admits intermittent bone/muscle pain for several months. Denied hematuria, urine difficulty, fever, SOB at rest. He is able to walk 1-2 blocks but has   SOB on exertion.    -2/9/24 CT abd/p with IV contrast showing moderate left hydroureteronephrosis extending to the left distal ureter with a previous left ureteral mass. Findings suggestive of rupture of the left renal pelvis with prominent edema and fluid around the left kidney as well as delayed left nephrogram. Multiple enlarged retroperitoneal lymph nodes suspicious for metastases. Multiple scattered lytic and osseous sclerotic lesions concerning for metastatic disease.   -2/9/24 wbc 5.4 Hb 12.1 MCV 78.6% plt 266; K5.3 Cr 0.9 BUN 19 ; Ca 8.5 ALT 79; AST 67  albumin 3.3 Glucose 463.  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 Associated upstream mild hydroureteronephrosis. Extensive perinephric/periureteral stranding and fluid may reflect associated renal pelvic calyceal rupture as reported. Nonspecific renal pelvis and ureteral enhancement as well as bladder wall thickening. Recommend correlation with urinalysis for superimposed infection. Evidence of cheryl and osseous metastatic disease.  3/1/2024 patient had initial consultation and recommended for tissue biopsy, referred to urology. Percocet was prescribed for back pain.  3/1/24 CTA showed no pulmonary embolus, nonspecific bilateral hilar lymph nodes. diffuse skeletal metastases. 3/8/2024 Pt had Bone scan which shows widespread extensive skeletal metastasis.    Medication glipiz//metformin 5-500mg two tablets  bid  losartan one tablet once daily  jardiance 10mg daily   PSHx appendectomy >30-40 yrs ago SHx Denies ETOH/smoking. 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  FHx Denies family history of cancers/malignancies.  Allergy NKDA   3/8 Patient p/w an urgent visit. Son (Conner) and daughter(Ingrid) present. According to patient and family, patient has been having worsening pain, especially left lower back and pelvic pressure which causes left leg numbness. He was not able to walk properly for 3 days now.  Feels nausea and not able to eat well, constipated, groin pressure, difficulty urination. Percocet 5/325 does not improve his pain. plan to see urology (Dr. Jefferson) on 3/19 for tissue biopsy consultation.  3/25 he was admitted to hospital b/o rapidly worsening left flank, back pain and difficulty walking rapidly worsening left flank, back pain and difficulty walking  MRI spine showed cervical cord compression. Discussed to hold off surgery and follow with ortho closely. PSA elevated at 624. Urology consulted placed a stent in left ureter due to outside mass compression to the distal ureter, did not see any mass inside the left ureter. 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 Reports his left shoulder pain was resolved, left hip pain and lower back pain was improved. HIs dexamethasone was tapered off. He is on percocet 5-325 1tab every 88hours as needed and lidocaine topical film.   4/19/24: Patient has b/l leg pain. He says he has had this pain since diagnosis, but feels it has worsened in the last week. He feels the pain when he walks, it is a throbbing sensation in his knees and calves. Patient endorses ongoing numbness in toes, no change from prior, says massage helps. Patient reports muscle twitching in arms and legs, family member endorses that they can visibly see muscles tensing.   Patient reports pain in L upper back near scapula, says it radiates around his axilla to his chest. This pain has been there for the past week. Patient feels the pain at rest and it is worse when he moves. On a scale of 1-10, the pain is rated as a 5, when he takes percocet it  improves to a 4..   Patient endorses shortness of breath on exertion, says this developed in the last three weeks, can walk 50-60 feet before becoming short of breath. He endorses palpitations on exertion. He denies cough and shortness of breath at rest. He denies chest pain.   After chemotherapy patient felt more tired for about the first week, after that he got back to his normal routine but has been doing things a bit slower. Says appetite is okay. Has not had fever or chills. No nausea or vomiting. Is slightly constipated, will sometimes go two days without having a BM. Denies sores in mouth and changes in taste. Denies rashes, does have dry skin. Reports urinary urgency, denies hematuria and dysuria.    5/29/24 reports random chill, hot flash and sweating and fatigue, improving his pain in the left upper back, bilateral legs and left pelvis. ALso states nausea, denies constipation and diarrhea. Also reports skin itchiness in both lower legs in last 4 weeks.   7/1/24 reports feeling better after completing a total of 4 cycles of chemotherapy. He reports hot flashes and sweating, shortness of breath, minor memory loss, pain in the left shoulder blade when moving around. He eats better now.   7/22/24 today is feeling well, Endorses nausea, no vomiting but with occasional constipation. He also notes pimple like cyst around the anus likely external hemorrhoids. He will increase fiber and fluids intake. In the event he is experiencing discomfort then he can use OTC witch hezel to help ease pain. Tolerating Tencentriq/ LUPRON but endorses sweating and cold alternation but tolerable.   8/14/24 pt is doing well, not in acute distress. Dependent on assistive device to get around [cane]. Endorses improvement in anal discomfort since increasing fiber and fluids. Urine flow is strong, and empties bladder completely. Appetite is good and maintaining weight. He continues to take one tablet of oxycodone due to chronic left shoulder pain, and b/l Knee joint pain [provides adequate relieve]  8/30/24 reports left shoulder, left shoulder blade and new pain in the left lower rib cage pain. Had normal appetite and energy level.   9/25/24 Pt returns today with c/o chronic left shoulder pain during movement but not at rest and denies dyspnea.  Appetite is fair and has since lost ~ 7 pounds. Pt advised to eat small frequent meals. Abdominal is soft, ND, + BS. No diarrhea or constipation. Urinary flow is strong and empties bladder completely. denies hematuria or urgency. Nocturia 5 x day, pt to reduce fluid intake during evening hours. He also endorses difficulty completing ADLs task due to shoulder pain.   10/16/24 yesterday devloped a fever 102 relieved by tylenol. So far he is afebrile. Reports multiple joint pain including bilateral hip, elbow and right shoulder. Eats btter. Reports hot flash and no sweating. He is chair bound.   11/6/25 - abiraterone started late Sept 2024 + olaparib started mid Oct 2024. Notes fatigue, with 2-3 naps a day which is stable since before start of abiraterone and olaparib. Ongoing hot flashes. Dizziness and nausea with standing for too long. Needs assistance with ambulation, dressing, bathing. Can walk short distances with a 1 person assist, does not use a walker as it doesn't fit in his house. Appetite is stable. Mild SOB with exertion, resolves with rest. Urine flow and urgency are improved, 3-4x/night. Ongoing pain of left scapula and L posterior ribs, bilateral shoulders, bilateral knees, bilateral ankles, somewhat improved, max pain is 5/10. Taking PRN Percocet 5/325mg occasionally but not every day.   [de-identified] : prostate  [de-identified] : 11/26/24 - abiraterone started late Sept 2024 + olaparib started mid Oct 2024. Ongoing fatigue is somewhat worse, can walk a couple feet with a one person assist before needing to stop and rest. Occasional dizziness with standing. Appetite is improved. Coughing for the past week. Urine flow is "good", urgency at times, 3-4x/night. Notes increased pain in the joints (shoulders, elbows, hips, knees, ankles). Notes a shock like pain in the left upper back/scapula and rib/flank area. All pains are worse with movement and improves with lying down. Max pain is 7/10 with movement. Takes PRN Tylenol for mild pain and Percocet 5-325mg 1 tab per day with good effect. Denies fever, chills, night sweats, hot flashes, headache, chest pain, palpitations, SOB, nausea/vomiting, diarrhea/constipation, abdominal pain, dysuria, hematuria, incontinence, LE edema, bleeding.

## 2024-12-19 NOTE — PHYSICAL EXAM
[de-identified] : anicteric  [de-identified] : no LE edema  [de-identified] : +BS; mild LLQ pain with palpation, no palpable masses or abnormalities though exam limited as pt in wheelchair [de-identified] : mild pain with palpation of lumbar spine [de-identified] : arrives by wheelchair, moves extremities freely

## 2024-12-19 NOTE — HISTORY OF PRESENT ILLNESS
[de-identified] : Mr. Marti is a 62 yo M with PMHx of HTN, HLD, DM on metformin, chronic left shoulder/knee/hip joint pain; he had the initial medical oncology consultation for finding of left kidney on 3/1/24:  He initially started having inermittent left flank pain rated as 7/10 lasting several hours. He went to [facility] and admitted for 2 days (AMA on Feb 10th 2024 ); he had CT abd/p done there. He was told that he needs nephrectomy but he denied since "his mother had similar situation; she had nephrectomy but pathology was benign". He went to [facility] on 2/11/24 and had CT abd/p done. He was discharged from ER on the same day. He reports intermittent generalized fatigue, poor appetite for several months, and lost 30 lb in 8 months. He admits intermittent bone/muscle pain for several months. Denied hematuria, urine difficulty, fever, SOB at rest. He is able to walk 1-2 blocks but has   SOB on exertion.    -2/9/24 CT abd/p with IV contrast showing moderate left hydroureteronephrosis extending to the left distal ureter with a previous left ureteral mass. Findings suggestive of rupture of the left renal pelvis with prominent edema and fluid around the left kidney as well as delayed left nephrogram. Multiple enlarged retroperitoneal lymph nodes suspicious for metastases. Multiple scattered lytic and osseous sclerotic lesions concerning for metastatic disease.   -2/9/24 wbc 5.4 Hb 12.1 MCV 78.6% plt 266; K5.3 Cr 0.9 BUN 19 ; Ca 8.5 ALT 79; AST 67  albumin 3.3 Glucose 463.  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 Associated upstream mild hydroureteronephrosis. Extensive perinephric/periureteral stranding and fluid may reflect associated renal pelvic calyceal rupture as reported. Nonspecific renal pelvis and ureteral enhancement as well as bladder wall thickening. Recommend correlation with urinalysis for superimposed infection. Evidence of cheryl and osseous metastatic disease.  3/1/2024 patient had initial consultation and recommended for tissue biopsy, referred to urology. Percocet was prescribed for back pain.  3/1/24 CTA showed no pulmonary embolus, nonspecific bilateral hilar lymph nodes. diffuse skeletal metastases. 3/8/2024 Pt had Bone scan which shows widespread extensive skeletal metastasis.    Medication glipiz//metformin 5-500mg two tablets  bid  losartan one tablet once daily  jardiance 10mg daily   PSHx appendectomy >30-40 yrs ago SHx Denies ETOH/smoking. 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  FHx Denies family history of cancers/malignancies.  Allergy NKDA   3/8 Patient p/w an urgent visit. Son (Conner) and daughter(Ingrid) present. According to patient and family, patient has been having worsening pain, especially left lower back and pelvic pressure which causes left leg numbness. He was not able to walk properly for 3 days now.  Feels nausea and not able to eat well, constipated, groin pressure, difficulty urination. Percocet 5/325 does not improve his pain. plan to see urology (Dr. Jefferson) on 3/19 for tissue biopsy consultation.  3/25 he was admitted to hospital b/o rapidly worsening left flank, back pain and difficulty walking rapidly worsening left flank, back pain and difficulty walking  MRI spine showed cervical cord compression. Discussed to hold off surgery and follow with ortho closely. PSA elevated at 624. Urology consulted placed a stent in left ureter due to outside mass compression to the distal ureter, did not see any mass inside the left ureter. 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 Reports his left shoulder pain was resolved, left hip pain and lower back pain was improved. HIs dexamethasone was tapered off. He is on percocet 5-325 1tab every 88hours as needed and lidocaine topical film.   4/19/24: Patient has b/l leg pain. He says he has had this pain since diagnosis, but feels it has worsened in the last week. He feels the pain when he walks, it is a throbbing sensation in his knees and calves. Patient endorses ongoing numbness in toes, no change from prior, says massage helps. Patient reports muscle twitching in arms and legs, family member endorses that they can visibly see muscles tensing.   Patient reports pain in L upper back near scapula, says it radiates around his axilla to his chest. This pain has been there for the past week. Patient feels the pain at rest and it is worse when he moves. On a scale of 1-10, the pain is rated as a 5, when he takes percocet it  improves to a 4..   Patient endorses shortness of breath on exertion, says this developed in the last three weeks, can walk 50-60 feet before becoming short of breath. He endorses palpitations on exertion. He denies cough and shortness of breath at rest. He denies chest pain.   After chemotherapy patient felt more tired for about the first week, after that he got back to his normal routine but has been doing things a bit slower. Says appetite is okay. Has not had fever or chills. No nausea or vomiting. Is slightly constipated, will sometimes go two days without having a BM. Denies sores in mouth and changes in taste. Denies rashes, does have dry skin. Reports urinary urgency, denies hematuria and dysuria.    5/29/24 reports random chill, hot flash and sweating and fatigue, improving his pain in the left upper back, bilateral legs and left pelvis. ALso states nausea, denies constipation and diarrhea. Also reports skin itchiness in both lower legs in last 4 weeks.   7/1/24 reports feeling better after completing a total of 4 cycles of chemotherapy. He reports hot flashes and sweating, shortness of breath, minor memory loss, pain in the left shoulder blade when moving around. He eats better now.   7/22/24 today is feeling well, Endorses nausea, no vomiting but with occasional constipation. He also notes pimple like cyst around the anus likely external hemorrhoids. He will increase fiber and fluids intake. In the event he is experiencing discomfort then he can use OTC witch hezel to help ease pain. Tolerating Tencentriq/ LUPRON but endorses sweating and cold alternation but tolerable.   8/14/24 pt is doing well, not in acute distress. Dependent on assistive device to get around [cane]. Endorses improvement in anal discomfort since increasing fiber and fluids. Urine flow is strong, and empties bladder completely. Appetite is good and maintaining weight. He continues to take one tablet of oxycodone due to chronic left shoulder pain, and b/l Knee joint pain [provides adequate relieve]  8/30/24 reports left shoulder, left shoulder blade and new pain in the left lower rib cage pain. Had normal appetite and energy level.   9/25/24 Pt returns today with c/o chronic left shoulder pain during movement but not at rest and denies dyspnea.  Appetite is fair and has since lost ~ 7 pounds. Pt advised to eat small frequent meals. Abdominal is soft, ND, + BS. No diarrhea or constipation. Urinary flow is strong and empties bladder completely. denies hematuria or urgency. Nocturia 5 x day, pt to reduce fluid intake during evening hours. He also endorses difficulty completing ADLs task due to shoulder pain.   10/16/24 yesterday devloped a fever 102 relieved by tylenol. So far he is afebrile. Reports multiple joint pain including bilateral hip, elbow and right shoulder. Eats btter. Reports hot flash and no sweating. He is chair bound.   11/6/25 - abiraterone started late Sept 2024 + olaparib started mid Oct 2024. Notes fatigue, with 2-3 naps a day which is stable since before start of abiraterone and olaparib. Ongoing hot flashes. Dizziness and nausea with standing for too long. Needs assistance with ambulation, dressing, bathing. Can walk short distances with a 1 person assist, does not use a walker as it doesn't fit in his house. Appetite is stable. Mild SOB with exertion, resolves with rest. Urine flow and urgency are improved, 3-4x/night. Ongoing pain of left scapula and L posterior ribs, bilateral shoulders, bilateral knees, bilateral ankles, somewhat improved, max pain is 5/10. Taking PRN Percocet 5/325mg occasionally but not every day.   [de-identified] : prostate  [de-identified] : 11/26/24 - abiraterone started late Sept 2024 + olaparib started mid Oct 2024. Ongoing fatigue is somewhat worse, can walk a couple feet with a one person assist before needing to stop and rest. Occasional dizziness with standing. Appetite is improved. Coughing for the past week. Urine flow is "good", urgency at times, 3-4x/night. Notes increased pain in the joints (shoulders, elbows, hips, knees, ankles). Notes a shock like pain in the left upper back/scapula and rib/flank area. All pains are worse with movement and improves with lying down. Max pain is 7/10 with movement. Takes PRN Tylenol for mild pain and Percocet 5-325mg 1 tab per day with good effect. Denies fever, chills, night sweats, hot flashes, headache, chest pain, palpitations, SOB, nausea/vomiting, diarrhea/constipation, abdominal pain, dysuria, hematuria, incontinence, LE edema, bleeding.

## 2024-12-19 NOTE — REVIEW OF SYSTEMS
[Fever] : no fever [Chills] : no chills [Night Sweats] : no night sweats [Chest Pain] : no chest pain [Palpitations] : no palpitations [Lower Ext Edema] : no lower extremity edema [Shortness Of Breath] : no shortness of breath [Abdominal Pain] : no abdominal pain [Vomiting] : no vomiting [Constipation] : no constipation [Dysuria] : no dysuria [Incontinence] : no incontinence [Hot Flashes] : no hot flashes [Easy Bleeding] : no tendency for easy bleeding [Easy Bruising] : no tendency for easy bruising

## 2024-12-19 NOTE — ASSESSMENT
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   In March 2024 he was admitted for worsening back pain and difficulty walking.   MRI spine showed cervical cord compression. Discussed to hold off surgery and follow with ortho closely. PSA elevated at 624. Urology consulted placed a stent in left ureter due to outside mass compression to the distal ureter, did not see any mass inside the left ureter. 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   On 4/1/24 pt was started on carbo/etoposide/tecentriq, plan for 4 cycles. Received first dose of lupron 4/3/24.  5/28/24 DEXA scan showed normal density.   9/10/24 CT CAP: Diffuse metastatic disease of the bones is markedly increased since prior exams. Previously noted enlarged hilar lymph nodes are now normal in size. Delayed left nephrogram with mild to moderate left hydroureter and hydronephrosis as well as some mucosal hyperemia (of infectious or inflammatory etiology). Previously present left perinephric stranding has markedly decreased.  9/10/24 NM Bone Scan: Compared to the bone scan in 3/2024, there is overall less intense and less heterogeneous foci of increased activity in the skeleton suggesting partial favorable response to therapy.   Plan   Prostate neuroendocrine adenocarcinoma: - Continue Lupron [id number], last given 10/19/24, next scheduled for 1/16/25.  - Foundation tissue Dx: BRCA2 loss, BRIP *, myc amplification, Rb loss and p53 mutation - Continue abiraterone 1,000mg daily + prednisone 5mg daily and olaparib 300mg bid. Reviewed potential AEs not limited to hypertension, abnormal liver function, edema, arthralgia, diarrhea, skin rash, hyperglycemia, cytopenias, fatigue, nausea/vomiting, decreased appetite.  - PSA lennie rapidly from 57.3 on 9/3/24 to 159 on 9/25/24. Most recently PSA lennie to 359 on 11/6/24. Repeat PSA today is pending. I am concerned about his rapidly rising PSA and worsening pains likely represent disease progression. He has only been on this combination therapy for about 1 month. Ideally, we would wait until after 3 months of therapy before obtaining scans however if his PSA continues to rise rapidly, we may consider repeat imaging given concern for clinical progression. Will discuss with Dr. Vergara once PSA results.   Bone mets: - Dexa scan on 5/29/24 reports normal bone density. 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 Potential side effects reviewed including but not limited to hypocalcemia and ONJ. Instructed to obtain dental clearance prior to start of Xgeva, this is still pending.   Pain  - Notes increased pain in the joints (shoulders, elbows, hips, knees, ankles). Notes a shock like pain in the left upper back/scapula and rib/flank area. All pains are worse with movement and improve with lying down. Max pain is 7/10 with movement. Takes PRN Tylenol for mild pain and Percocet 5-325mg 1 tab at bedtime with good effect. - Continue gabapentin 300mg qhs  - Continue PRN Tylenol per package insert. I have refilled for just PRN oxycodone 5mg q6hrs for moderate to severe pain. Bowel regimen in place.   Instructed to contact our office with any new/worsening symptoms. Pt educated regarding plan of care, all questions/concerns addressed to the best of my abilities and his apparent satisfaction. F/u in 3wks for close monitoring at start of treatment with abiraterone + olaparib.   ***ADDENDUM*** 11/26 PSA down to 219 from 359 on 11/6. Chromogranin A is down to 263 from 521 on 11/6. Per d/w Dr. Vergara, will continue abiraterone + olaparib as prescribed and reassess at next visit. Pt's son Conner is in agreement with this plan.

## 2024-12-23 NOTE — PHYSICAL EXAM
[Capable of only limited self care, confined to bed or chair more than 50% of waking hours] : Status 3- Capable of only limited self care, confined to bed or chair more than 50% of waking hours [Normal] : affect appropriate [de-identified] : anicteric  [de-identified] : no LE edema  [de-identified] : arrives by wheelchair, R wrist is mildly swollen with decreased ROM

## 2024-12-23 NOTE — ASSESSMENT
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   In March 2024 he was admitted for worsening back pain and difficulty walking.   MRI spine showed cervical cord compression. Discussed to hold off surgery and follow with ortho closely. PSA elevated at 624. Urology consulted placed a stent in left ureter due to outside mass compression to the distal ureter, did not see any mass inside the left ureter. 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   On 4/1/24 pt was started on carbo/etoposide/tecentriq, plan for 4 cycles. Received first dose of lupron 4/3/24.  5/28/24 DEXA scan showed normal density.   9/10/24 CT CAP: Diffuse metastatic disease of the bones is markedly increased since prior exams. Previously noted enlarged hilar lymph nodes are now normal in size. Delayed left nephrogram with mild to moderate left hydroureter and hydronephrosis as well as some mucosal hyperemia (of infectious or inflammatory etiology). Previously present left perinephric stranding has markedly decreased.  9/10/24 NM Bone Scan: Compared to the bone scan in 3/2024, there is overall less intense and less heterogeneous foci of increased activity in the skeleton suggesting partial favorable response to therapy.   Plan   Prostate neuroendocrine adenocarcinoma: - Continue Lupron [id number], last given 10/19/24, next scheduled for 1/16/25.  - Foundation tissue Dx: BRCA2 loss, BRIP *, myc amplification, Rb loss and p53 mutation - Continue abiraterone 1,000mg daily + prednisone 5mg daily and olaparib 300mg bid. Reviewed potential AEs not limited to hypertension, abnormal liver function, edema, arthralgia, diarrhea, skin rash, hyperglycemia, cytopenias, fatigue, nausea/vomiting, decreased appetite.  - PSA lennie rapidly from 57.3 on 9/3/24 to 159 on 9/25/24. Most recently PSA lennie to 359 on 11/6/24, down to 219 on 11/26/24. Repeat PSA today is pending.  - Repeat CT chest/abd/pelvis and bone scan ordered.   Bone mets: - Dexa scan on 5/29/24 reports normal bone density. 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 Potential side effects reviewed including but not limited to hypocalcemia and ONJ. Instructed to obtain dental clearance prior to start of Xgeva, this is still pending. He is requesting referral to a dentist, I have emailed [facility] to assist with this.   Pain  - Fell on Saturday, he was climbing the second step of stairs and fell back landing on his right shoulder and right wrist. R wrist is swollen with decreased mobility, max pain is 9/10. Cancer related pains of the left upper back and shoulder are somewhat improved, max pain is 3/10. Taking PRN oxycodone 5mg up to 2-3x/day for R wrist pain, not for back pain.  - Continue PRN Tylenol per package insert and PRN oxycodone 5mg q6hrs for moderate to severe pain. Bowel regimen in place.  - Will obtain an Xray of the R wrist and hand given pain and decreased ROM following a fall.   Physical debilitation: - Requires assistance with bathing and dressing. Unsteady balance.  - Referral for home PT requested.   Instructed to contact our office with any new/worsening symptoms. Pt and family educated regarding plan of care, all questions/concerns addressed to the best of my abilities and their apparent satisfaction. F/u in 4wks

## 2024-12-23 NOTE — HISTORY OF PRESENT ILLNESS
[de-identified] : Mr. Marti is a 64 yo M with PMHx of HTN, HLD, DM on metformin, chronic left shoulder/knee/hip joint pain; he had the initial medical oncology consultation for finding of left kidney on 3/1/24:  He initially started having inermittent left flank pain rated as 7/10 lasting several hours. He went to [facility] and admitted for 2 days (AMA on Feb 10th 2024 ); he had CT abd/p done there. He was told that he needs nephrectomy but he denied since "his mother had similar situation; she had nephrectomy but pathology was benign". He went to [facility] ER on 2/11/24 and had CT abd/p done. He was discharged from ER on the same day. He reports intermittent generalized fatigue, poor appetite for several months, and lost 30 lb in 8 months. He admits intermittent bone/muscle pain for several months. Denied hematuria, urine difficulty, fever, SOB at rest. He is able to walk 1-2 blocks but has   SOB on exertion.    -2/9/24 CT abd/p with IV contrast showing moderate left hydroureteronephrosis extending to the left distal ureter with a previous left ureteral mass. Findings suggestive of rupture of the left renal pelvis with prominent edema and fluid around the left kidney as well as delayed left nephrogram. Multiple enlarged retroperitoneal lymph nodes suspicious for metastases. Multiple scattered lytic and osseous sclerotic lesions concerning for metastatic disease.   -2/9/24 wbc 5.4 Hb 12.1 MCV 78.6% plt 266; K5.3 Cr 0.9 BUN 19 ; Ca 8.5 ALT 79; AST 67  albumin 3.3 Glucose 463.  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 Associated upstream mild hydroureteronephrosis. Extensive perinephric/periureteral stranding and fluid may reflect associated renal pelvic calyceal rupture as reported. Nonspecific renal pelvis and ureteral enhancement as well as bladder wall thickening. Recommend correlation with urinalysis for superimposed infection. Evidence of cheryl and osseous metastatic disease.  3/1/2024 patient had initial consultation and recommended for tissue biopsy, referred to urology. Percocet was prescribed for back pain.  3/1/24 CTA showed no pulmonary embolus, nonspecific bilateral hilar lymph nodes. diffuse skeletal metastases. 3/8/2024 Pt had Bone scan which shows widespread extensive skeletal metastasis.    Medication glipiz//metformin 5-500mg two tablets  bid  losartan one tablet once daily  jardiance 10mg daily   PSHx appendectomy >30-40 yrs ago SHx Denies ETOH/smoking. 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  FHx Denies family history of cancers/malignancies.  Allergy NKDA   3/8 Patient p/w an urgent visit. Son (Conner) and daughter(Ingrid) present. According to patient and family, patient has been having worsening pain, especially left lower back and pelvic pressure which causes left leg numbness. He was not able to walk properly for 3 days now.  Feels nausea and not able to eat well, constipated, groin pressure, difficulty urination. Percocet 5/325 does not improve his pain. plan to see urology (Dr. Jefferson) on 3/19 for tissue biopsy consultation.  3/25 he was admitted to hospital b/o rapidly worsening left flank, back pain and difficulty walking rapidly worsening left flank, back pain and difficulty walking  MRI spine showed cervical cord compression. Discussed to hold off surgery and follow with ortho closely. PSA elevated at 624. Urology consulted placed a stent in left ureter due to outside mass compression to the distal ureter, did not see any mass inside the left ureter. 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 Reports his left shoulder pain was resolved, left hip pain and lower back pain was improved. HIs dexamethasone was tapered off. He is on percocet 5-325 1tab every 88hours as needed and lidocaine topical film.   4/19/24: Patient has b/l leg pain. He says he has had this pain since diagnosis, but feels it has worsened in the last week. He feels the pain when he walks, it is a throbbing sensation in his knees and calves. Patient endorses ongoing numbness in toes, no change from prior, says massage helps. Patient reports muscle twitching in arms and legs, family member endorses that they can visibly see muscles tensing.   Patient reports pain in L upper back near scapula, says it radiates around his axilla to his chest. This pain has been there for the past week. Patient feels the pain at rest and it is worse when he moves. On a scale of 1-10, the pain is rated as a 5, when he takes percocet it  improves to a 4..   Patient endorses shortness of breath on exertion, says this developed in the last three weeks, can walk 50-60 feet before becoming short of breath. He endorses palpitations on exertion. He denies cough and shortness of breath at rest. He denies chest pain.   After chemotherapy patient felt more tired for about the first week, after that he got back to his normal routine but has been doing things a bit slower. Says appetite is okay. Has not had fever or chills. No nausea or vomiting. Is slightly constipated, will sometimes go two days without having a BM. Denies sores in mouth and changes in taste. Denies rashes, does have dry skin. Reports urinary urgency, denies hematuria and dysuria.    5/29/24 reports random chill, hot flash and sweating and fatigue, improving his pain in the left upper back, bilateral legs and left pelvis. ALso states nausea, denies constipation and diarrhea. Also reports skin itchiness in both lower legs in last 4 weeks.   7/1/24 reports feeling better after completing a total of 4 cycles of chemotherapy. He reports hot flashes and sweating, shortness of breath, minor memory loss, pain in the left shoulder blade when moving around. He eats better now.   7/22/24 today is feeling well, Endorses nausea, no vomiting but with occasional constipation. He also notes pimple like cyst around the anus likely external hemorrhoids. He will increase fiber and fluids intake. In the event he is experiencing discomfort then he can use OTC witch hezel to help ease pain. Tolerating Tencentriq/ LUPRON but endorses sweating and cold alternation but tolerable.   8/14/24 pt is doing well, not in acute distress. Dependent on assistive device to get around [cane]. Endorses improvement in anal discomfort since increasing fiber and fluids. Urine flow is strong, and empties bladder completely. Appetite is good and maintaining weight. He continues to take one tablet of oxycodone due to chronic left shoulder pain, and b/l Knee joint pain [provides adequate relieve]  8/30/24 reports left shoulder, left shoulder blade and new pain in the left lower rib cage pain. Had normal appetite and energy level.   9/25/24 Pt returns today with c/o chronic left shoulder pain during movement but not at rest and denies dyspnea.  Appetite is fair and has since lost ~ 7 pounds. Pt advised to eat small frequent meals. Abdominal is soft, ND, + BS. No diarrhea or constipation. Urinary flow is strong and empties bladder completely. denies hematuria or urgency. Nocturia 5 x day, pt to reduce fluid intake during evening hours. He also endorses difficulty completing ADLs task due to shoulder pain.   10/16/24 yesterday devloped a fever 102 relieved by tylenol. So far he is afebrile. Reports multiple joint pain including bilateral hip, elbow and right shoulder. Eats btter. Reports hot flash and no sweating. He is chair bound.   11/6/25 - abiraterone started late Sept 2024 + olaparib started mid Oct 2024. Notes fatigue, with 2-3 naps a day which is stable since before start of abiraterone and olaparib. Ongoing hot flashes. Dizziness and nausea with standing for too long. Needs assistance with ambulation, dressing, bathing. Can walk short distances with a 1 person assist, does not use a walker as it doesn't fit in his house. Appetite is stable. Mild SOB with exertion, resolves with rest. Urine flow and urgency are improved, 3-4x/night. Ongoing pain of left scapula and L posterior ribs, bilateral shoulders, bilateral knees, bilateral ankles, somewhat improved, max pain is 5/10. Taking PRN Percocet 5/325mg occasionally but not every day.   [de-identified] : prostate  [de-identified] : 11/26/24 - abiraterone started late Sept 2024 + olaparib started mid Oct 2024. Ongoing fatigue is somewhat worse, can walk a couple feet with a one person assist before needing to stop and rest. Occasional dizziness with standing. Appetite is improved. Coughing for the past week. Urine flow is "good", urgency at times, 3-4x/night. Notes increased pain in the joints (shoulders, elbows, hips, knees, ankles). Notes a shock like pain in the left upper back/scapula and rib/flank area. All pains are worse with movement and improves with lying down. Max pain is 7/10 with movement. Takes PRN Tylenol for mild pain and Percocet 5-325mg 1 tab per day with good effect.   12/23/24 - abiraterone started late Sept 2024 + olaparib started mid Oct 2024. Ongoing fatigue. Unsteady balance and dizziness at times. Fell on Saturday, he was climbing the second step of stairs and fell back landing on his right shoulder and right wrist. R wrist is swollen with decreased mobility, max pain is 9/10. Left upper back and shoulder pain are somewhat improved, max pain is 3/10. Taking PRN oxycodone 5mg up to 2-3x/day for R wrist pain, not for back pain. Urine flow is "good", notes urgency with occasional leakage., nocturia 4x/night. Denies fever, chills, night sweats, hot flashes, headache, chest pain, palpitations, SOB, cough, nausea/vomiting, diarrhea/constipation, abdominal pain, dysuria, hematuria, LE edema, rash/pruritus, bleeding

## 2024-12-23 NOTE — PHYSICAL EXAM
[Capable of only limited self care, confined to bed or chair more than 50% of waking hours] : Status 3- Capable of only limited self care, confined to bed or chair more than 50% of waking hours [Normal] : affect appropriate [de-identified] : anicteric  [de-identified] : no LE edema  [de-identified] : arrives by wheelchair, R wrist is mildly swollen with decreased ROM

## 2024-12-23 NOTE — HISTORY OF PRESENT ILLNESS
[de-identified] : Mr. Marti is a 62 yo M with PMHx of HTN, HLD, DM on metformin, chronic left shoulder/knee/hip joint pain; he had the initial medical oncology consultation for finding of left kidney on 3/1/24:  He initially started having inermittent left flank pain rated as 7/10 lasting several hours. He went to [facility] ER and admitted for 2 days (AMA on Feb 10th 2024 ); he had CT abd/p done there. He was told that he needs nephrectomy but he denied since "his mother had similar situation; she had nephrectomy but pathology was benign". He went to [facility] ER on 2/11/24 and had CT abd/p done. He was discharged from ER on the same day. He reports intermittent generalized fatigue, poor appetite for several months, and lost 30 lb in 8 months. He admits intermittent bone/muscle pain for several months. Denied hematuria, urine difficulty, fever, SOB at rest. He is able to walk 1-2 blocks but has   SOB on exertion.    -2/9/24 CT abd/p with IV contrast showing moderate left hydroureteronephrosis extending to the left distal ureter with a previous left ureteral mass. Findings suggestive of rupture of the left renal pelvis with prominent edema and fluid around the left kidney as well as delayed left nephrogram. Multiple enlarged retroperitoneal lymph nodes suspicious for metastases. Multiple scattered lytic and osseous sclerotic lesions concerning for metastatic disease.   -2/9/24 wbc 5.4 Hb 12.1 MCV 78.6% plt 266; K5.3 Cr 0.9 BUN 19 ; Ca 8.5 ALT 79; AST 67  albumin 3.3 Glucose 463.  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 Associated upstream mild hydroureteronephrosis. Extensive perinephric/periureteral stranding and fluid may reflect associated renal pelvic calyceal rupture as reported. Nonspecific renal pelvis and ureteral enhancement as well as bladder wall thickening. Recommend correlation with urinalysis for superimposed infection. Evidence of cheryl and osseous metastatic disease.  3/1/2024 patient had initial consultation and recommended for tissue biopsy, referred to urology. Percocet was prescribed for back pain.  3/1/24 CTA showed no pulmonary embolus, nonspecific bilateral hilar lymph nodes. diffuse skeletal metastases. 3/8/2024 Pt had Bone scan which shows widespread extensive skeletal metastasis.    Medication glipiz//metformin 5-500mg two tablets  bid  losartan one tablet once daily  jardiance 10mg daily   PSHx appendectomy >30-40 yrs ago SHx Denies ETOH/smoking. 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  FHx Denies family history of cancers/malignancies.  Allergy NKDA   3/8 Patient p/w an urgent visit. Son (Conner) and daughter(Ingrid) present. According to patient and family, patient has been having worsening pain, especially left lower back and pelvic pressure which causes left leg numbness. He was not able to walk properly for 3 days now.  Feels nausea and not able to eat well, constipated, groin pressure, difficulty urination. Percocet 5/325 does not improve his pain. plan to see urology (Dr. Jefferson) on 3/19 for tissue biopsy consultation.  3/25 he was admitted to hospital b/o rapidly worsening left flank, back pain and difficulty walking rapidly worsening left flank, back pain and difficulty walking  MRI spine showed cervical cord compression. Discussed to hold off surgery and follow with ortho closely. PSA elevated at 624. Urology consulted placed a stent in left ureter due to outside mass compression to the distal ureter, did not see any mass inside the left ureter. 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 Reports his left shoulder pain was resolved, left hip pain and lower back pain was improved. HIs dexamethasone was tapered off. He is on percocet 5-325 1tab every 88hours as needed and lidocaine topical film.   4/19/24: Patient has b/l leg pain. He says he has had this pain since diagnosis, but feels it has worsened in the last week. He feels the pain when he walks, it is a throbbing sensation in his knees and calves. Patient endorses ongoing numbness in toes, no change from prior, says massage helps. Patient reports muscle twitching in arms and legs, family member endorses that they can visibly see muscles tensing.   Patient reports pain in L upper back near scapula, says it radiates around his axilla to his chest. This pain has been there for the past week. Patient feels the pain at rest and it is worse when he moves. On a scale of 1-10, the pain is rated as a 5, when he takes percocet it  improves to a 4..   Patient endorses shortness of breath on exertion, says this developed in the last three weeks, can walk 50-60 feet before becoming short of breath. He endorses palpitations on exertion. He denies cough and shortness of breath at rest. He denies chest pain.   After chemotherapy patient felt more tired for about the first week, after that he got back to his normal routine but has been doing things a bit slower. Says appetite is okay. Has not had fever or chills. No nausea or vomiting. Is slightly constipated, will sometimes go two days without having a BM. Denies sores in mouth and changes in taste. Denies rashes, does have dry skin. Reports urinary urgency, denies hematuria and dysuria.    5/29/24 reports random chill, hot flash and sweating and fatigue, improving his pain in the left upper back, bilateral legs and left pelvis. ALso states nausea, denies constipation and diarrhea. Also reports skin itchiness in both lower legs in last 4 weeks.   7/1/24 reports feeling better after completing a total of 4 cycles of chemotherapy. He reports hot flashes and sweating, shortness of breath, minor memory loss, pain in the left shoulder blade when moving around. He eats better now.   7/22/24 today is feeling well, Endorses nausea, no vomiting but with occasional constipation. He also notes pimple like cyst around the anus likely external hemorrhoids. He will increase fiber and fluids intake. In the event he is experiencing discomfort then he can use OTC witch hezel to help ease pain. Tolerating Tencentriq/ LUPRON but endorses sweating and cold alternation but tolerable.   8/14/24 pt is doing well, not in acute distress. Dependent on assistive device to get around [cane]. Endorses improvement in anal discomfort since increasing fiber and fluids. Urine flow is strong, and empties bladder completely. Appetite is good and maintaining weight. He continues to take one tablet of oxycodone due to chronic left shoulder pain, and b/l Knee joint pain [provides adequate relieve]  8/30/24 reports left shoulder, left shoulder blade and new pain in the left lower rib cage pain. Had normal appetite and energy level.   9/25/24 Pt returns today with c/o chronic left shoulder pain during movement but not at rest and denies dyspnea.  Appetite is fair and has since lost ~ 7 pounds. Pt advised to eat small frequent meals. Abdominal is soft, ND, + BS. No diarrhea or constipation. Urinary flow is strong and empties bladder completely. denies hematuria or urgency. Nocturia 5 x day, pt to reduce fluid intake during evening hours. He also endorses difficulty completing ADLs task due to shoulder pain.   10/16/24 yesterday devloped a fever 102 relieved by tylenol. So far he is afebrile. Reports multiple joint pain including bilateral hip, elbow and right shoulder. Eats btter. Reports hot flash and no sweating. He is chair bound.   11/6/25 - abiraterone started late Sept 2024 + olaparib started mid Oct 2024. Notes fatigue, with 2-3 naps a day which is stable since before start of abiraterone and olaparib. Ongoing hot flashes. Dizziness and nausea with standing for too long. Needs assistance with ambulation, dressing, bathing. Can walk short distances with a 1 person assist, does not use a walker as it doesn't fit in his house. Appetite is stable. Mild SOB with exertion, resolves with rest. Urine flow and urgency are improved, 3-4x/night. Ongoing pain of left scapula and L posterior ribs, bilateral shoulders, bilateral knees, bilateral ankles, somewhat improved, max pain is 5/10. Taking PRN Percocet 5/325mg occasionally but not every day.   [de-identified] : prostate  [de-identified] : 11/26/24 - abiraterone started late Sept 2024 + olaparib started mid Oct 2024. Ongoing fatigue is somewhat worse, can walk a couple feet with a one person assist before needing to stop and rest. Occasional dizziness with standing. Appetite is improved. Coughing for the past week. Urine flow is "good", urgency at times, 3-4x/night. Notes increased pain in the joints (shoulders, elbows, hips, knees, ankles). Notes a shock like pain in the left upper back/scapula and rib/flank area. All pains are worse with movement and improves with lying down. Max pain is 7/10 with movement. Takes PRN Tylenol for mild pain and Percocet 5-325mg 1 tab per day with good effect.   12/23/24 - abiraterone started late Sept 2024 + olaparib started mid Oct 2024. Ongoing fatigue. Unsteady balance and dizziness at times. Fell on Saturday, he was climbing the second step of stairs and fell back landing on his right shoulder and right wrist. R wrist is swollen with decreased mobility, max pain is 9/10. Left upper back and shoulder pain are somewhat improved, max pain is 3/10. Taking PRN oxycodone 5mg up to 2-3x/day for R wrist pain, not for back pain. Urine flow is "good", notes urgency with occasional leakage., nocturia 4x/night. Denies fever, chills, night sweats, hot flashes, headache, chest pain, palpitations, SOB, cough, nausea/vomiting, diarrhea/constipation, abdominal pain, dysuria, hematuria, LE edema, rash/pruritus, bleeding

## 2024-12-23 NOTE — ASSESSMENT
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   In March 2024 he was admitted for worsening back pain and difficulty walking.   MRI spine showed cervical cord compression. Discussed to hold off surgery and follow with ortho closely. PSA elevated at 624. Urology consulted placed a stent in left ureter due to outside mass compression to the distal ureter, did not see any mass inside the left ureter. 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   On 4/1/24 pt was started on carbo/etoposide/tecentriq, plan for 4 cycles. Received first dose of lupron 4/3/24.  5/28/24 DEXA scan showed normal density.   9/10/24 CT CAP: Diffuse metastatic disease of the bones is markedly increased since prior exams. Previously noted enlarged hilar lymph nodes are now normal in size. Delayed left nephrogram with mild to moderate left hydroureter and hydronephrosis as well as some mucosal hyperemia (of infectious or inflammatory etiology). Previously present left perinephric stranding has markedly decreased.  9/10/24 NM Bone Scan: Compared to the bone scan in 3/2024, there is overall less intense and less heterogeneous foci of increased activity in the skeleton suggesting partial favorable response to therapy.   Plan   Prostate neuroendocrine adenocarcinoma: - Continue Lupron [id number], last given 10/19/24, next scheduled for 1/16/25.  - Foundation tissue Dx: BRCA2 loss, BRIP *, myc amplification, Rb loss and p53 mutation - Continue abiraterone 1,000mg daily + prednisone 5mg daily and olaparib 300mg bid. Reviewed potential AEs not limited to hypertension, abnormal liver function, edema, arthralgia, diarrhea, skin rash, hyperglycemia, cytopenias, fatigue, nausea/vomiting, decreased appetite.  - PSA lennie rapidly from 57.3 on 9/3/24 to 159 on 9/25/24. Most recently PSA lennie to 359 on 11/6/24, down to 219 on 11/26/24. Repeat PSA today is pending.  - Repeat CT chest/abd/pelvis and bone scan ordered.   Bone mets: - Dexa scan on 5/29/24 reports normal bone density. 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 Potential side effects reviewed including but not limited to hypocalcemia and ONJ. Instructed to obtain dental clearance prior to start of Xgeva, this is still pending. He is requesting referral to a dentist, I have emailed [facility] to assist with this.   Pain  - Fell on Saturday, he was climbing the second step of stairs and fell back landing on his right shoulder and right wrist. R wrist is swollen with decreased mobility, max pain is 9/10. Cancer related pains of the left upper back and shoulder are somewhat improved, max pain is 3/10. Taking PRN oxycodone 5mg up to 2-3x/day for R wrist pain, not for back pain.  - Continue PRN Tylenol per package insert and PRN oxycodone 5mg q6hrs for moderate to severe pain. Bowel regimen in place.  - Will obtain an Xray of the R wrist and hand given pain and decreased ROM following a fall.   Physical debilitation: - Requires assistance with bathing and dressing. Unsteady balance.  - Referral for home PT requested.   Instructed to contact our office with any new/worsening symptoms. Pt and family educated regarding plan of care, all questions/concerns addressed to the best of my abilities and their apparent satisfaction. F/u in 4wks

## 2024-12-23 NOTE — PHYSICAL EXAM
[Capable of only limited self care, confined to bed or chair more than 50% of waking hours] : Status 3- Capable of only limited self care, confined to bed or chair more than 50% of waking hours [Normal] : affect appropriate [de-identified] : anicteric  [de-identified] : no LE edema  [de-identified] : arrives by wheelchair, R wrist is mildly swollen with decreased ROM

## 2024-12-23 NOTE — REVIEW OF SYSTEMS
[Fatigue] : fatigue [Recent Change In Weight] : ~T recent weight change [Diarrhea: Grade 0] : Diarrhea: Grade 0 [Muscle Pain] : muscle pain [Muscle Weakness] : muscle weakness [Dizziness] : dizziness [Fever] : no fever [Chills] : no chills [Night Sweats] : no night sweats [Chest Pain] : no chest pain [Palpitations] : no palpitations [Lower Ext Edema] : no lower extremity edema [Shortness Of Breath] : no shortness of breath [Cough] : no cough [Abdominal Pain] : no abdominal pain [Vomiting] : no vomiting [Constipation] : no constipation [Dysuria] : no dysuria [Incontinence] : no incontinence [Joint Pain] : no joint pain [Joint Stiffness] : no joint stiffness [Skin Rash] : no skin rash [Hot Flashes] : no hot flashes [Easy Bleeding] : no tendency for easy bleeding [Easy Bruising] : no tendency for easy bruising

## 2024-12-23 NOTE — HISTORY OF PRESENT ILLNESS
[de-identified] : Mr. Marti is a 64 yo M with PMHx of HTN, HLD, DM on metformin, chronic left shoulder/knee/hip joint pain; he had the initial medical oncology consultation for finding of left kidney on 3/1/24:  He initially started having inermittent left flank pain rated as 7/10 lasting several hours. He went to [facility] and admitted for 2 days (AMA on Feb 10th 2024 ); he had CT abd/p done there. He was told that he needs nephrectomy but he denied since "his mother had similar situation; she had nephrectomy but pathology was benign". He went to [facility] ER on 2/11/24 and had CT abd/p done. He was discharged from ER on the same day. He reports intermittent generalized fatigue, poor appetite for several months, and lost 30 lb in 8 months. He admits intermittent bone/muscle pain for several months. Denied hematuria, urine difficulty, fever, SOB at rest. He is able to walk 1-2 blocks but has   SOB on exertion.    -2/9/24 CT abd/p with IV contrast showing moderate left hydroureteronephrosis extending to the left distal ureter with a previous left ureteral mass. Findings suggestive of rupture of the left renal pelvis with prominent edema and fluid around the left kidney as well as delayed left nephrogram. Multiple enlarged retroperitoneal lymph nodes suspicious for metastases. Multiple scattered lytic and osseous sclerotic lesions concerning for metastatic disease.   -2/9/24 wbc 5.4 Hb 12.1 MCV 78.6% plt 266; K5.3 Cr 0.9 BUN 19 ; Ca 8.5 ALT 79; AST 67  albumin 3.3 Glucose 463.  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 Associated upstream mild hydroureteronephrosis. Extensive perinephric/periureteral stranding and fluid may reflect associated renal pelvic calyceal rupture as reported. Nonspecific renal pelvis and ureteral enhancement as well as bladder wall thickening. Recommend correlation with urinalysis for superimposed infection. Evidence of cheryl and osseous metastatic disease.  3/1/2024 patient had initial consultation and recommended for tissue biopsy, referred to urology. Percocet was prescribed for back pain.  3/1/24 CTA showed no pulmonary embolus, nonspecific bilateral hilar lymph nodes. diffuse skeletal metastases. 3/8/2024 Pt had Bone scan which shows widespread extensive skeletal metastasis.    Medication glipiz//metformin 5-500mg two tablets  bid  losartan one tablet once daily  jardiance 10mg daily   PSHx appendectomy >30-40 yrs ago SHx Denies ETOH/smoking. 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  FHx Denies family history of cancers/malignancies.  Allergy NKDA   3/8 Patient p/w an urgent visit. Son (Conner) and daughter(Ingrid) present. According to patient and family, patient has been having worsening pain, especially left lower back and pelvic pressure which causes left leg numbness. He was not able to walk properly for 3 days now.  Feels nausea and not able to eat well, constipated, groin pressure, difficulty urination. Percocet 5/325 does not improve his pain. plan to see urology (Dr. Jefferson) on 3/19 for tissue biopsy consultation.  3/25 he was admitted to hospital b/o rapidly worsening left flank, back pain and difficulty walking rapidly worsening left flank, back pain and difficulty walking  MRI spine showed cervical cord compression. Discussed to hold off surgery and follow with ortho closely. PSA elevated at 624. Urology consulted placed a stent in left ureter due to outside mass compression to the distal ureter, did not see any mass inside the left ureter. 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 Reports his left shoulder pain was resolved, left hip pain and lower back pain was improved. HIs dexamethasone was tapered off. He is on percocet 5-325 1tab every 88hours as needed and lidocaine topical film.   4/19/24: Patient has b/l leg pain. He says he has had this pain since diagnosis, but feels it has worsened in the last week. He feels the pain when he walks, it is a throbbing sensation in his knees and calves. Patient endorses ongoing numbness in toes, no change from prior, says massage helps. Patient reports muscle twitching in arms and legs, family member endorses that they can visibly see muscles tensing.   Patient reports pain in L upper back near scapula, says it radiates around his axilla to his chest. This pain has been there for the past week. Patient feels the pain at rest and it is worse when he moves. On a scale of 1-10, the pain is rated as a 5, when he takes percocet it  improves to a 4..   Patient endorses shortness of breath on exertion, says this developed in the last three weeks, can walk 50-60 feet before becoming short of breath. He endorses palpitations on exertion. He denies cough and shortness of breath at rest. He denies chest pain.   After chemotherapy patient felt more tired for about the first week, after that he got back to his normal routine but has been doing things a bit slower. Says appetite is okay. Has not had fever or chills. No nausea or vomiting. Is slightly constipated, will sometimes go two days without having a BM. Denies sores in mouth and changes in taste. Denies rashes, does have dry skin. Reports urinary urgency, denies hematuria and dysuria.    5/29/24 reports random chill, hot flash and sweating and fatigue, improving his pain in the left upper back, bilateral legs and left pelvis. ALso states nausea, denies constipation and diarrhea. Also reports skin itchiness in both lower legs in last 4 weeks.   7/1/24 reports feeling better after completing a total of 4 cycles of chemotherapy. He reports hot flashes and sweating, shortness of breath, minor memory loss, pain in the left shoulder blade when moving around. He eats better now.   7/22/24 today is feeling well, Endorses nausea, no vomiting but with occasional constipation. He also notes pimple like cyst around the anus likely external hemorrhoids. He will increase fiber and fluids intake. In the event he is experiencing discomfort then he can use OTC witch hezel to help ease pain. Tolerating Tencentriq/ LUPRON but endorses sweating and cold alternation but tolerable.   8/14/24 pt is doing well, not in acute distress. Dependent on assistive device to get around [cane]. Endorses improvement in anal discomfort since increasing fiber and fluids. Urine flow is strong, and empties bladder completely. Appetite is good and maintaining weight. He continues to take one tablet of oxycodone due to chronic left shoulder pain, and b/l Knee joint pain [provides adequate relieve]  8/30/24 reports left shoulder, left shoulder blade and new pain in the left lower rib cage pain. Had normal appetite and energy level.   9/25/24 Pt returns today with c/o chronic left shoulder pain during movement but not at rest and denies dyspnea.  Appetite is fair and has since lost ~ 7 pounds. Pt advised to eat small frequent meals. Abdominal is soft, ND, + BS. No diarrhea or constipation. Urinary flow is strong and empties bladder completely. denies hematuria or urgency. Nocturia 5 x day, pt to reduce fluid intake during evening hours. He also endorses difficulty completing ADLs task due to shoulder pain.   10/16/24 yesterday devloped a fever 102 relieved by tylenol. So far he is afebrile. Reports multiple joint pain including bilateral hip, elbow and right shoulder. Eats btter. Reports hot flash and no sweating. He is chair bound.   11/6/25 - abiraterone started late Sept 2024 + olaparib started mid Oct 2024. Notes fatigue, with 2-3 naps a day which is stable since before start of abiraterone and olaparib. Ongoing hot flashes. Dizziness and nausea with standing for too long. Needs assistance with ambulation, dressing, bathing. Can walk short distances with a 1 person assist, does not use a walker as it doesn't fit in his house. Appetite is stable. Mild SOB with exertion, resolves with rest. Urine flow and urgency are improved, 3-4x/night. Ongoing pain of left scapula and L posterior ribs, bilateral shoulders, bilateral knees, bilateral ankles, somewhat improved, max pain is 5/10. Taking PRN Percocet 5/325mg occasionally but not every day.   [de-identified] : prostate  [de-identified] : 11/26/24 - abiraterone started late Sept 2024 + olaparib started mid Oct 2024. Ongoing fatigue is somewhat worse, can walk a couple feet with a one person assist before needing to stop and rest. Occasional dizziness with standing. Appetite is improved. Coughing for the past week. Urine flow is "good", urgency at times, 3-4x/night. Notes increased pain in the joints (shoulders, elbows, hips, knees, ankles). Notes a shock like pain in the left upper back/scapula and rib/flank area. All pains are worse with movement and improves with lying down. Max pain is 7/10 with movement. Takes PRN Tylenol for mild pain and Percocet 5-325mg 1 tab per day with good effect.   12/23/24 - abiraterone started late Sept 2024 + olaparib started mid Oct 2024. Ongoing fatigue. Unsteady balance and dizziness at times. Fell on Saturday, he was climbing the second step of stairs and fell back landing on his right shoulder and right wrist. R wrist is swollen with decreased mobility, max pain is 9/10. Left upper back and shoulder pain are somewhat improved, max pain is 3/10. Taking PRN oxycodone 5mg up to 2-3x/day for R wrist pain, not for back pain. Urine flow is "good", notes urgency with occasional leakage., nocturia 4x/night. Denies fever, chills, night sweats, hot flashes, headache, chest pain, palpitations, SOB, cough, nausea/vomiting, diarrhea/constipation, abdominal pain, dysuria, hematuria, LE edema, rash/pruritus, bleeding

## 2025-01-21 NOTE — BEGINNING OF VISIT
[0] : 2) Feeling down, depressed, or hopeless: Not at all (0) [PHQ-2 Negative] : PHQ-2 Negative [[id number]] : 0 [Pain Scale: ___] : On a scale of 1-10, today the patient's pain is a(n) [unfilled]. [Future Reassessment of Pain Scale] : Future reassessment of pain scale [Medication(s)] : Medication(s) [Never] : Never [Date Discussed (MM/DD/YY): ___] : Discussed: [unfilled] [With Patient/Caregiver] : with Patient/Caregiver [Abdominal Pain] : abdominal pain [Vomiting] : no vomiting [Constipation] : no constipation [Diarrhea Character] : Diarrhea: Grade 0

## 2025-01-21 NOTE — PHYSICAL EXAM
[Ambulatory and capable of all self care but unable to carry out any work activities] : Status 2- Ambulatory and capable of all self care but unable to carry out any work activities. Up and about more than 50% of waking hours [Normal] : affect appropriate [de-identified] : anicteric  [de-identified] : no LE edema

## 2025-01-21 NOTE — HISTORY OF PRESENT ILLNESS
[de-identified] : Mr. Marti is a 62 yo M with PMHx of HTN, HLD, DM on metformin, chronic left shoulder/knee/hip joint pain; he had the initial medical oncology consultation for finding of left kidney on 3/1/24:  He initially started having inermittent left flank pain rated as 7/10 lasting several hours. He went to [facility] and admitted for 2 days (AMA on Feb 10th 2024 ); he had CT abd/p done there. He was told that he needs nephrectomy but he denied since "his mother had similar situation; she had nephrectomy but pathology was benign". He went to [facility] ER on 2/11/24 and had CT abd/p done. He was discharged from ER on the same day. He reports intermittent generalized fatigue, poor appetite for several months, and lost 30 lb in 8 months. He admits intermittent bone/muscle pain for several months. Denied hematuria, urine difficulty, fever, SOB at rest. He is able to walk 1-2 blocks but has   SOB on exertion.    -2/9/24 CT abd/p with IV contrast showing moderate left hydroureteronephrosis extending to the left distal ureter with a previous left ureteral mass. Findings suggestive of rupture of the left renal pelvis with prominent edema and fluid around the left kidney as well as delayed left nephrogram. Multiple enlarged retroperitoneal lymph nodes suspicious for metastases. Multiple scattered lytic and osseous sclerotic lesions concerning for metastatic disease.   -2/9/24 wbc 5.4 Hb 12.1 MCV 78.6% plt 266; K5.3 Cr 0.9 BUN 19 ; Ca 8.5 ALT 79; AST 67  albumin 3.3 Glucose 463.  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 Associated upstream mild hydroureteronephrosis. Extensive perinephric/periureteral stranding and fluid may reflect associated renal pelvic calyceal rupture as reported. Nonspecific renal pelvis and ureteral enhancement as well as bladder wall thickening. Recommend correlation with urinalysis for superimposed infection. Evidence of cheryl and osseous metastatic disease.  3/1/2024 patient had initial consultation and recommended for tissue biopsy, referred to urology. Percocet was prescribed for back pain.  3/1/24 CTA showed no pulmonary embolus, nonspecific bilateral hilar lymph nodes. diffuse skeletal metastases. 3/8/2024 Pt had Bone scan which shows widespread extensive skeletal metastasis.    Medication glipiz//metformin 5-500mg two tablets  bid  losartan one tablet once daily  jardiance 10mg daily   PSHx appendectomy >30-40 yrs ago SHx Denies ETOH/smoking. 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  FHx Denies family history of cancers/malignancies.  Allergy NKDA   3/8 Patient p/w an urgent visit. Son (Conner) and daughter(Ingrid) present. According to patient and family, patient has been having worsening pain, especially left lower back and pelvic pressure which causes left leg numbness. He was not able to walk properly for 3 days now.  Feels nausea and not able to eat well, constipated, groin pressure, difficulty urination. Percocet 5/325 does not improve his pain. plan to see urology (Dr. Jefferson) on 3/19 for tissue biopsy consultation.  3/25 he was admitted to hospital b/o rapidly worsening left flank, back pain and difficulty walking rapidly worsening left flank, back pain and difficulty walking  MRI spine showed cervical cord compression. Discussed to hold off surgery and follow with ortho closely. PSA elevated at 624. Urology consulted placed a stent in left ureter due to outside mass compression to the distal ureter, did not see any mass inside the left ureter. 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 Reports his left shoulder pain was resolved, left hip pain and lower back pain was improved. HIs dexamethasone was tapered off. He is on percocet 5-325 1tab every 88hours as needed and lidocaine topical film.   4/19/24: Patient has b/l leg pain. He says he has had this pain since diagnosis, but feels it has worsened in the last week. He feels the pain when he walks, it is a throbbing sensation in his knees and calves. Patient endorses ongoing numbness in toes, no change from prior, says massage helps. Patient reports muscle twitching in arms and legs, family member endorses that they can visibly see muscles tensing.   Patient reports pain in L upper back near scapula, says it radiates around his axilla to his chest. This pain has been there for the past week. Patient feels the pain at rest and it is worse when he moves. On a scale of 1-10, the pain is rated as a 5, when he takes percocet it  improves to a 4..   Patient endorses shortness of breath on exertion, says this developed in the last three weeks, can walk 50-60 feet before becoming short of breath. He endorses palpitations on exertion. He denies cough and shortness of breath at rest. He denies chest pain.   After chemotherapy patient felt more tired for about the first week, after that he got back to his normal routine but has been doing things a bit slower. Says appetite is okay. Has not had fever or chills. No nausea or vomiting. Is slightly constipated, will sometimes go two days without having a BM. Denies sores in mouth and changes in taste. Denies rashes, does have dry skin. Reports urinary urgency, denies hematuria and dysuria.    5/29/24 reports random chill, hot flash and sweating and fatigue, improving his pain in the left upper back, bilateral legs and left pelvis. ALso states nausea, denies constipation and diarrhea. Also reports skin itchiness in both lower legs in last 4 weeks.   7/1/24 reports feeling better after completing a total of 4 cycles of chemotherapy. He reports hot flashes and sweating, shortness of breath, minor memory loss, pain in the left shoulder blade when moving around. He eats better now.   7/22/24 today is feeling well, Endorses nausea, no vomiting but with occasional constipation. He also notes pimple like cyst around the anus likely external hemorrhoids. He will increase fiber and fluids intake. In the event he is experiencing discomfort then he can use OTC witch hezel to help ease pain. Tolerating Tencentriq/ LUPRON but endorses sweating and cold alternation but tolerable.   8/14/24 pt is doing well, not in acute distress. Dependent on assistive device to get around [cane]. Endorses improvement in anal discomfort since increasing fiber and fluids. Urine flow is strong, and empties bladder completely. Appetite is good and maintaining weight. He continues to take one tablet of oxycodone due to chronic left shoulder pain, and b/l Knee joint pain [provides adequate relieve]  8/30/24 reports left shoulder, left shoulder blade and new pain in the left lower rib cage pain. Had normal appetite and energy level.   9/25/24 Pt returns today with c/o chronic left shoulder pain during movement but not at rest and denies dyspnea.  Appetite is fair and has since lost ~ 7 pounds. Pt advised to eat small frequent meals. Abdominal is soft, ND, + BS. No diarrhea or constipation. Urinary flow is strong and empties bladder completely. denies hematuria or urgency. Nocturia 5 x day, pt to reduce fluid intake during evening hours. He also endorses difficulty completing ADLs task due to shoulder pain.   10/16/24 yesterday devloped a fever 102 relieved by tylenol. So far he is afebrile. Reports multiple joint pain including bilateral hip, elbow and right shoulder. Eats btter. Reports hot flash and no sweating. He is chair bound.   11/6/25 - abiraterone started late Sept 2024 + olaparib started mid Oct 2024. Notes fatigue, with 2-3 naps a day which is stable since before start of abiraterone and olaparib. Ongoing hot flashes. Dizziness and nausea with standing for too long. Needs assistance with ambulation, dressing, bathing. Can walk short distances with a 1 person assist, does not use a walker as it doesn't fit in his house. Appetite is stable. Mild SOB with exertion, resolves with rest. Urine flow and urgency are improved, 3-4x/night. Ongoing pain of left scapula and L posterior ribs, bilateral shoulders, bilateral knees, bilateral ankles, somewhat improved, max pain is 5/10. Taking PRN Percocet 5/325mg occasionally but not every day.   [de-identified] : prostate  [de-identified] : 11/26/24 - abiraterone started late Sept 2024 + olaparib started mid Oct 2024. Ongoing fatigue is somewhat worse, can walk a couple feet with a one person assist before needing to stop and rest. Occasional dizziness with standing. Appetite is improved. Coughing for the past week. Urine flow is "good", urgency at times, 3-4x/night. Notes increased pain in the joints (shoulders, elbows, hips, knees, ankles). Notes a shock like pain in the left upper back/scapula and rib/flank area. All pains are worse with movement and improves with lying down. Max pain is 7/10 with movement. Takes PRN Tylenol for mild pain and Percocet 5-325mg 1 tab per day with good effect.   12/23/24 - abiraterone started late Sept 2024 + olaparib started mid Oct 2024. Ongoing fatigue. Unsteady balance and dizziness at times. Fell on Saturday, he was climbing the second step of stairs and fell back landing on his right shoulder and right wrist. R wrist is swollen with decreased mobility, max pain is 9/10. Left upper back and shoulder pain are somewhat improved, max pain is 3/10. Taking PRN oxycodone 5mg up to 2-3x/day for R wrist pain, not for back pain. Urine flow is "good", notes urgency with occasional leakage., nocturia 4x/night. Denies fever, chills, night sweats, hot flashes, headache, chest pain, palpitations, SOB, cough, nausea/vomiting, diarrhea/constipation, abdominal pain, dysuria, hematuria, LE edema, rash/pruritus, bleeding  1/21/25 - abiraterone started late Sept 2024 + olaparib started mid Oct 2024. Some fatigue at times. Ongoing pains of L scapula and joints are much improved, max pain is 4/10. Using PRN oxycodone 5mg 1-2x/day with good effect. Notes tooth pain, was seen at the dental clinic and reportedly needs 12 extractions done, he is trying to have denture molds made prior to extractions. Intermittent hot flashes. Mild SOB with exertion, resolves with rest. Appetite is "good". Occasional nausea, PRN Zofran is helpful. L sided abdominal pain at times which he attributes to the ureteral stent which is due to be exchanged. Notes urine urgency, occasional hematuria, last episode was a couple weeks ago, some leakage at times, nocturia 4x/night. Denies fever, chills, night sweats, headache, dizziness, chest pain, palpitations, cough, vomiting, diarrhea/constipation, dysuria, incontinence, LE edema, rash/pruritus, bleeding.

## 2025-01-21 NOTE — PHYSICAL EXAM
[Ambulatory and capable of all self care but unable to carry out any work activities] : Status 2- Ambulatory and capable of all self care but unable to carry out any work activities. Up and about more than 50% of waking hours [Normal] : affect appropriate [de-identified] : anicteric  [de-identified] : no LE edema

## 2025-01-21 NOTE — HISTORY OF PRESENT ILLNESS
[de-identified] : Mr. Marti is a 62 yo M with PMHx of HTN, HLD, DM on metformin, chronic left shoulder/knee/hip joint pain; he had the initial medical oncology consultation for finding of left kidney on 3/1/24:  He initially started having inermittent left flank pain rated as 7/10 lasting several hours. He went to [facility] and admitted for 2 days (AMA on Feb 10th 2024 ); he had CT abd/p done there. He was told that he needs nephrectomy but he denied since "his mother had similar situation; she had nephrectomy but pathology was benign". He went to [facility] ER on 2/11/24 and had CT abd/p done. He was discharged from ER on the same day. He reports intermittent generalized fatigue, poor appetite for several months, and lost 30 lb in 8 months. He admits intermittent bone/muscle pain for several months. Denied hematuria, urine difficulty, fever, SOB at rest. He is able to walk 1-2 blocks but has   SOB on exertion.    -2/9/24 CT abd/p with IV contrast showing moderate left hydroureteronephrosis extending to the left distal ureter with a previous left ureteral mass. Findings suggestive of rupture of the left renal pelvis with prominent edema and fluid around the left kidney as well as delayed left nephrogram. Multiple enlarged retroperitoneal lymph nodes suspicious for metastases. Multiple scattered lytic and osseous sclerotic lesions concerning for metastatic disease.   -2/9/24 wbc 5.4 Hb 12.1 MCV 78.6% plt 266; K5.3 Cr 0.9 BUN 19 ; Ca 8.5 ALT 79; AST 67  albumin 3.3 Glucose 463.  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 Associated upstream mild hydroureteronephrosis. Extensive perinephric/periureteral stranding and fluid may reflect associated renal pelvic calyceal rupture as reported. Nonspecific renal pelvis and ureteral enhancement as well as bladder wall thickening. Recommend correlation with urinalysis for superimposed infection. Evidence of cheryl and osseous metastatic disease.  3/1/2024 patient had initial consultation and recommended for tissue biopsy, referred to urology. Percocet was prescribed for back pain.  3/1/24 CTA showed no pulmonary embolus, nonspecific bilateral hilar lymph nodes. diffuse skeletal metastases. 3/8/2024 Pt had Bone scan which shows widespread extensive skeletal metastasis.    Medication glipiz//metformin 5-500mg two tablets  bid  losartan one tablet once daily  jardiance 10mg daily   PSHx appendectomy >30-40 yrs ago SHx Denies ETOH/smoking. 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  FHx Denies family history of cancers/malignancies.  Allergy NKDA   3/8 Patient p/w an urgent visit. Son (Conner) and daughter(Ingrid) present. According to patient and family, patient has been having worsening pain, especially left lower back and pelvic pressure which causes left leg numbness. He was not able to walk properly for 3 days now.  Feels nausea and not able to eat well, constipated, groin pressure, difficulty urination. Percocet 5/325 does not improve his pain. plan to see urology (Dr. Jefferson) on 3/19 for tissue biopsy consultation.  3/25 he was admitted to hospital b/o rapidly worsening left flank, back pain and difficulty walking rapidly worsening left flank, back pain and difficulty walking  MRI spine showed cervical cord compression. Discussed to hold off surgery and follow with ortho closely. PSA elevated at 624. Urology consulted placed a stent in left ureter due to outside mass compression to the distal ureter, did not see any mass inside the left ureter. 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 Reports his left shoulder pain was resolved, left hip pain and lower back pain was improved. HIs dexamethasone was tapered off. He is on percocet 5-325 1tab every 88hours as needed and lidocaine topical film.   4/19/24: Patient has b/l leg pain. He says he has had this pain since diagnosis, but feels it has worsened in the last week. He feels the pain when he walks, it is a throbbing sensation in his knees and calves. Patient endorses ongoing numbness in toes, no change from prior, says massage helps. Patient reports muscle twitching in arms and legs, family member endorses that they can visibly see muscles tensing.   Patient reports pain in L upper back near scapula, says it radiates around his axilla to his chest. This pain has been there for the past week. Patient feels the pain at rest and it is worse when he moves. On a scale of 1-10, the pain is rated as a 5, when he takes percocet it  improves to a 4..   Patient endorses shortness of breath on exertion, says this developed in the last three weeks, can walk 50-60 feet before becoming short of breath. He endorses palpitations on exertion. He denies cough and shortness of breath at rest. He denies chest pain.   After chemotherapy patient felt more tired for about the first week, after that he got back to his normal routine but has been doing things a bit slower. Says appetite is okay. Has not had fever or chills. No nausea or vomiting. Is slightly constipated, will sometimes go two days without having a BM. Denies sores in mouth and changes in taste. Denies rashes, does have dry skin. Reports urinary urgency, denies hematuria and dysuria.    5/29/24 reports random chill, hot flash and sweating and fatigue, improving his pain in the left upper back, bilateral legs and left pelvis. ALso states nausea, denies constipation and diarrhea. Also reports skin itchiness in both lower legs in last 4 weeks.   7/1/24 reports feeling better after completing a total of 4 cycles of chemotherapy. He reports hot flashes and sweating, shortness of breath, minor memory loss, pain in the left shoulder blade when moving around. He eats better now.   7/22/24 today is feeling well, Endorses nausea, no vomiting but with occasional constipation. He also notes pimple like cyst around the anus likely external hemorrhoids. He will increase fiber and fluids intake. In the event he is experiencing discomfort then he can use OTC witch hezel to help ease pain. Tolerating Tencentriq/ LUPRON but endorses sweating and cold alternation but tolerable.   8/14/24 pt is doing well, not in acute distress. Dependent on assistive device to get around [cane]. Endorses improvement in anal discomfort since increasing fiber and fluids. Urine flow is strong, and empties bladder completely. Appetite is good and maintaining weight. He continues to take one tablet of oxycodone due to chronic left shoulder pain, and b/l Knee joint pain [provides adequate relieve]  8/30/24 reports left shoulder, left shoulder blade and new pain in the left lower rib cage pain. Had normal appetite and energy level.   9/25/24 Pt returns today with c/o chronic left shoulder pain during movement but not at rest and denies dyspnea.  Appetite is fair and has since lost ~ 7 pounds. Pt advised to eat small frequent meals. Abdominal is soft, ND, + BS. No diarrhea or constipation. Urinary flow is strong and empties bladder completely. denies hematuria or urgency. Nocturia 5 x day, pt to reduce fluid intake during evening hours. He also endorses difficulty completing ADLs task due to shoulder pain.   10/16/24 yesterday devloped a fever 102 relieved by tylenol. So far he is afebrile. Reports multiple joint pain including bilateral hip, elbow and right shoulder. Eats btter. Reports hot flash and no sweating. He is chair bound.   11/6/25 - abiraterone started late Sept 2024 + olaparib started mid Oct 2024. Notes fatigue, with 2-3 naps a day which is stable since before start of abiraterone and olaparib. Ongoing hot flashes. Dizziness and nausea with standing for too long. Needs assistance with ambulation, dressing, bathing. Can walk short distances with a 1 person assist, does not use a walker as it doesn't fit in his house. Appetite is stable. Mild SOB with exertion, resolves with rest. Urine flow and urgency are improved, 3-4x/night. Ongoing pain of left scapula and L posterior ribs, bilateral shoulders, bilateral knees, bilateral ankles, somewhat improved, max pain is 5/10. Taking PRN Percocet 5/325mg occasionally but not every day.   [de-identified] : prostate  [de-identified] : 11/26/24 - abiraterone started late Sept 2024 + olaparib started mid Oct 2024. Ongoing fatigue is somewhat worse, can walk a couple feet with a one person assist before needing to stop and rest. Occasional dizziness with standing. Appetite is improved. Coughing for the past week. Urine flow is "good", urgency at times, 3-4x/night. Notes increased pain in the joints (shoulders, elbows, hips, knees, ankles). Notes a shock like pain in the left upper back/scapula and rib/flank area. All pains are worse with movement and improves with lying down. Max pain is 7/10 with movement. Takes PRN Tylenol for mild pain and Percocet 5-325mg 1 tab per day with good effect.   12/23/24 - abiraterone started late Sept 2024 + olaparib started mid Oct 2024. Ongoing fatigue. Unsteady balance and dizziness at times. Fell on Saturday, he was climbing the second step of stairs and fell back landing on his right shoulder and right wrist. R wrist is swollen with decreased mobility, max pain is 9/10. Left upper back and shoulder pain are somewhat improved, max pain is 3/10. Taking PRN oxycodone 5mg up to 2-3x/day for R wrist pain, not for back pain. Urine flow is "good", notes urgency with occasional leakage., nocturia 4x/night. Denies fever, chills, night sweats, hot flashes, headache, chest pain, palpitations, SOB, cough, nausea/vomiting, diarrhea/constipation, abdominal pain, dysuria, hematuria, LE edema, rash/pruritus, bleeding  1/21/25 - abiraterone started late Sept 2024 + olaparib started mid Oct 2024. Some fatigue at times. Ongoing pains of L scapula and joints are much improved, max pain is 4/10. Using PRN oxycodone 5mg 1-2x/day with good effect. Notes tooth pain, was seen at the dental clinic and reportedly needs 12 extractions done, he is trying to have denture molds made prior to extractions. Intermittent hot flashes. Mild SOB with exertion, resolves with rest. Appetite is "good". Occasional nausea, PRN Zofran is helpful. L sided abdominal pain at times which he attributes to the ureteral stent which is due to be exchanged. Notes urine urgency, occasional hematuria, last episode was a couple weeks ago, some leakage at times, nocturia 4x/night. Denies fever, chills, night sweats, headache, dizziness, chest pain, palpitations, cough, vomiting, diarrhea/constipation, dysuria, incontinence, LE edema, rash/pruritus, bleeding.

## 2025-01-21 NOTE — REVIEW OF SYSTEMS
[Fatigue] : fatigue [SOB on Exertion] : shortness of breath during exertion [Diarrhea: Grade 0] : Diarrhea: Grade 0 [Incontinence] : incontinence [Joint Pain] : joint pain [Joint Stiffness] : joint stiffness [Muscle Pain] : muscle pain [Hot Flashes] : hot flashes [Fever] : no fever [Chills] : no chills [Night Sweats] : no night sweats [Chest Pain] : no chest pain [Palpitations] : no palpitations [Lower Ext Edema] : no lower extremity edema [Cough] : no cough [Abdominal Pain] : no abdominal pain [Vomiting] : no vomiting [Constipation] : no constipation [Dysuria] : no dysuria [Muscle Weakness] : no muscle weakness [Dizziness] : no dizziness [Easy Bleeding] : no tendency for easy bleeding [Easy Bruising] : no tendency for easy bruising

## 2025-01-21 NOTE — ASSESSMENT
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   In March 2024 he was admitted for worsening back pain and difficulty walking.   MRI spine showed cervical cord compression. Discussed to hold off surgery and follow with ortho closely. PSA elevated at 624. Urology consulted placed a stent in left ureter due to outside mass compression to the distal ureter, did not see any mass inside the left ureter. 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   On 4/1/24 pt was started on carbo/etoposide/tecentriq, plan for 4 cycles. Received first dose of lupron 4/3/24.  5/28/24 DEXA scan showed normal density.   9/10/24 CT CAP: Diffuse metastatic disease of the bones is markedly increased since prior exams. Previously noted enlarged hilar lymph nodes are now normal in size. Delayed left nephrogram with mild to moderate left hydroureter and hydronephrosis as well as some mucosal hyperemia (of infectious or inflammatory etiology). Previously present left perinephric stranding has markedly decreased.  9/10/24 NM Bone Scan: Compared to the bone scan in 3/2024, there is overall less intense and less heterogeneous foci of increased activity in the skeleton suggesting partial favorable response to therapy.   Plan   Prostate neuroendocrine adenocarcinoma: - Continue Lupron [id number], last given 1/21/25, next due April 2025.  - Foundation tissue Dx: BRCA2 loss, BRIP *, myc amplification, Rb loss and p53 mutation - Continue abiraterone 1,000mg daily + prednisone 5mg daily and olaparib 300mg bid. Reviewed potential AEs not limited to hypertension, abnormal liver function, edema, arthralgia, diarrhea, skin rash, hyperglycemia, cytopenias, fatigue, nausea/vomiting, decreased appetite.  - PSA lennie rapidly from 57.3 on 9/3/24 to 159 on 9/25/24. Most recently PSA lennie to 359 on 11/6/24, down to 219 on 11/26/24, 151 on 12/23/24. Repeat PSA today is pending.  - Clinically he's had a good response with decreased pain in the setting of a declining PSA and chromograin A. Repeat CT chest/abd/pelvis and bone scan ordered at last visit, pt instructed to schedule asap.   Bone mets: - Dexa scan on 5/29/24 reports normal bone density. 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 Potential side effects reviewed including but not limited to hypocalcemia and ONJ. Instructed to obtain dental clearance prior to start of Xgeva, he reportedly needs 12 teeth extracted, he will advise us once complete.   Pain  - Cancer related pains of the left upper back and joints are improved, max pain is 4/10. Taking PRN oxycodone 5mg up to 2x/day with good effect. - Continue PRN Tylenol per package insert and PRN oxycodone 5mg q6hrs for moderate to severe pain. Bowel regimen in place.   Physical debilitation: - Requires assistance with bathing and dressing. Unsteady balance.  - Referral for home PT requested.   Instructed to contact our office with any new/worsening symptoms. Pt and family educated regarding plan of care, all questions/concerns addressed to the best of my abilities and their apparent satisfaction. F/u in 4wks

## 2025-01-21 NOTE — HISTORY OF PRESENT ILLNESS
[de-identified] : Mr. Marti is a 62 yo M with PMHx of HTN, HLD, DM on metformin, chronic left shoulder/knee/hip joint pain; he had the initial medical oncology consultation for finding of left kidney on 3/1/24:  He initially started having inermittent left flank pain rated as 7/10 lasting several hours. He went to [facility] and admitted for 2 days (AMA on Feb 10th 2024 ); he had CT abd/p done there. He was told that he needs nephrectomy but he denied since "his mother had similar situation; she had nephrectomy but pathology was benign". He went to [facility] ER on 2/11/24 and had CT abd/p done. He was discharged from ER on the same day. He reports intermittent generalized fatigue, poor appetite for several months, and lost 30 lb in 8 months. He admits intermittent bone/muscle pain for several months. Denied hematuria, urine difficulty, fever, SOB at rest. He is able to walk 1-2 blocks but has   SOB on exertion.    -2/9/24 CT abd/p with IV contrast showing moderate left hydroureteronephrosis extending to the left distal ureter with a previous left ureteral mass. Findings suggestive of rupture of the left renal pelvis with prominent edema and fluid around the left kidney as well as delayed left nephrogram. Multiple enlarged retroperitoneal lymph nodes suspicious for metastases. Multiple scattered lytic and osseous sclerotic lesions concerning for metastatic disease.   -2/9/24 wbc 5.4 Hb 12.1 MCV 78.6% plt 266; K5.3 Cr 0.9 BUN 19 ; Ca 8.5 ALT 79; AST 67  albumin 3.3 Glucose 463.  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 Associated upstream mild hydroureteronephrosis. Extensive perinephric/periureteral stranding and fluid may reflect associated renal pelvic calyceal rupture as reported. Nonspecific renal pelvis and ureteral enhancement as well as bladder wall thickening. Recommend correlation with urinalysis for superimposed infection. Evidence of cheryl and osseous metastatic disease.  3/1/2024 patient had initial consultation and recommended for tissue biopsy, referred to urology. Percocet was prescribed for back pain.  3/1/24 CTA showed no pulmonary embolus, nonspecific bilateral hilar lymph nodes. diffuse skeletal metastases. 3/8/2024 Pt had Bone scan which shows widespread extensive skeletal metastasis.    Medication glipiz//metformin 5-500mg two tablets  bid  losartan one tablet once daily  jardiance 10mg daily   PSHx appendectomy >30-40 yrs ago SHx Denies ETOH/smoking. 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  FHx Denies family history of cancers/malignancies.  Allergy NKDA   3/8 Patient p/w an urgent visit. Son (Conner) and daughter(Ingrid) present. According to patient and family, patient has been having worsening pain, especially left lower back and pelvic pressure which causes left leg numbness. He was not able to walk properly for 3 days now.  Feels nausea and not able to eat well, constipated, groin pressure, difficulty urination. Percocet 5/325 does not improve his pain. plan to see urology (Dr. Jefferson) on 3/19 for tissue biopsy consultation.  3/25 he was admitted to hospital b/o rapidly worsening left flank, back pain and difficulty walking rapidly worsening left flank, back pain and difficulty walking  MRI spine showed cervical cord compression. Discussed to hold off surgery and follow with ortho closely. PSA elevated at 624. Urology consulted placed a stent in left ureter due to outside mass compression to the distal ureter, did not see any mass inside the left ureter. 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 Reports his left shoulder pain was resolved, left hip pain and lower back pain was improved. HIs dexamethasone was tapered off. He is on percocet 5-325 1tab every 88hours as needed and lidocaine topical film.   4/19/24: Patient has b/l leg pain. He says he has had this pain since diagnosis, but feels it has worsened in the last week. He feels the pain when he walks, it is a throbbing sensation in his knees and calves. Patient endorses ongoing numbness in toes, no change from prior, says massage helps. Patient reports muscle twitching in arms and legs, family member endorses that they can visibly see muscles tensing.   Patient reports pain in L upper back near scapula, says it radiates around his axilla to his chest. This pain has been there for the past week. Patient feels the pain at rest and it is worse when he moves. On a scale of 1-10, the pain is rated as a 5, when he takes percocet it  improves to a 4..   Patient endorses shortness of breath on exertion, says this developed in the last three weeks, can walk 50-60 feet before becoming short of breath. He endorses palpitations on exertion. He denies cough and shortness of breath at rest. He denies chest pain.   After chemotherapy patient felt more tired for about the first week, after that he got back to his normal routine but has been doing things a bit slower. Says appetite is okay. Has not had fever or chills. No nausea or vomiting. Is slightly constipated, will sometimes go two days without having a BM. Denies sores in mouth and changes in taste. Denies rashes, does have dry skin. Reports urinary urgency, denies hematuria and dysuria.    5/29/24 reports random chill, hot flash and sweating and fatigue, improving his pain in the left upper back, bilateral legs and left pelvis. ALso states nausea, denies constipation and diarrhea. Also reports skin itchiness in both lower legs in last 4 weeks.   7/1/24 reports feeling better after completing a total of 4 cycles of chemotherapy. He reports hot flashes and sweating, shortness of breath, minor memory loss, pain in the left shoulder blade when moving around. He eats better now.   7/22/24 today is feeling well, Endorses nausea, no vomiting but with occasional constipation. He also notes pimple like cyst around the anus likely external hemorrhoids. He will increase fiber and fluids intake. In the event he is experiencing discomfort then he can use OTC witch hezel to help ease pain. Tolerating Tencentriq/ LUPRON but endorses sweating and cold alternation but tolerable.   8/14/24 pt is doing well, not in acute distress. Dependent on assistive device to get around [cane]. Endorses improvement in anal discomfort since increasing fiber and fluids. Urine flow is strong, and empties bladder completely. Appetite is good and maintaining weight. He continues to take one tablet of oxycodone due to chronic left shoulder pain, and b/l Knee joint pain [provides adequate relieve]  8/30/24 reports left shoulder, left shoulder blade and new pain in the left lower rib cage pain. Had normal appetite and energy level.   9/25/24 Pt returns today with c/o chronic left shoulder pain during movement but not at rest and denies dyspnea.  Appetite is fair and has since lost ~ 7 pounds. Pt advised to eat small frequent meals. Abdominal is soft, ND, + BS. No diarrhea or constipation. Urinary flow is strong and empties bladder completely. denies hematuria or urgency. Nocturia 5 x day, pt to reduce fluid intake during evening hours. He also endorses difficulty completing ADLs task due to shoulder pain.   10/16/24 yesterday devloped a fever 102 relieved by tylenol. So far he is afebrile. Reports multiple joint pain including bilateral hip, elbow and right shoulder. Eats btter. Reports hot flash and no sweating. He is chair bound.   11/6/25 - abiraterone started late Sept 2024 + olaparib started mid Oct 2024. Notes fatigue, with 2-3 naps a day which is stable since before start of abiraterone and olaparib. Ongoing hot flashes. Dizziness and nausea with standing for too long. Needs assistance with ambulation, dressing, bathing. Can walk short distances with a 1 person assist, does not use a walker as it doesn't fit in his house. Appetite is stable. Mild SOB with exertion, resolves with rest. Urine flow and urgency are improved, 3-4x/night. Ongoing pain of left scapula and L posterior ribs, bilateral shoulders, bilateral knees, bilateral ankles, somewhat improved, max pain is 5/10. Taking PRN Percocet 5/325mg occasionally but not every day.   [de-identified] : prostate  [de-identified] : 11/26/24 - abiraterone started late Sept 2024 + olaparib started mid Oct 2024. Ongoing fatigue is somewhat worse, can walk a couple feet with a one person assist before needing to stop and rest. Occasional dizziness with standing. Appetite is improved. Coughing for the past week. Urine flow is "good", urgency at times, 3-4x/night. Notes increased pain in the joints (shoulders, elbows, hips, knees, ankles). Notes a shock like pain in the left upper back/scapula and rib/flank area. All pains are worse with movement and improves with lying down. Max pain is 7/10 with movement. Takes PRN Tylenol for mild pain and Percocet 5-325mg 1 tab per day with good effect.   12/23/24 - abiraterone started late Sept 2024 + olaparib started mid Oct 2024. Ongoing fatigue. Unsteady balance and dizziness at times. Fell on Saturday, he was climbing the second step of stairs and fell back landing on his right shoulder and right wrist. R wrist is swollen with decreased mobility, max pain is 9/10. Left upper back and shoulder pain are somewhat improved, max pain is 3/10. Taking PRN oxycodone 5mg up to 2-3x/day for R wrist pain, not for back pain. Urine flow is "good", notes urgency with occasional leakage., nocturia 4x/night. Denies fever, chills, night sweats, hot flashes, headache, chest pain, palpitations, SOB, cough, nausea/vomiting, diarrhea/constipation, abdominal pain, dysuria, hematuria, LE edema, rash/pruritus, bleeding  1/21/25 - abiraterone started late Sept 2024 + olaparib started mid Oct 2024. Some fatigue at times. Ongoing pains of L scapula and joints are much improved, max pain is 4/10. Using PRN oxycodone 5mg 1-2x/day with good effect. Notes tooth pain, was seen at the dental clinic and reportedly needs 12 extractions done, he is trying to have denture molds made prior to extractions. Intermittent hot flashes. Mild SOB with exertion, resolves with rest. Appetite is "good". Occasional nausea, PRN Zofran is helpful. L sided abdominal pain at times which he attributes to the ureteral stent which is due to be exchanged. Notes urine urgency, occasional hematuria, last episode was a couple weeks ago, some leakage at times, nocturia 4x/night. Denies fever, chills, night sweats, headache, dizziness, chest pain, palpitations, cough, vomiting, diarrhea/constipation, dysuria, incontinence, LE edema, rash/pruritus, bleeding.

## 2025-01-21 NOTE — BEGINNING OF VISIT
[0] : 2) Feeling down, depressed, or hopeless: Not at all (0) [PHQ-2 Negative] : PHQ-2 Negative [[id number]] : 0 [Pain Scale: ___] : On a scale of 1-10, today the patient's pain is a(n) [unfilled]. [Future Reassessment of Pain Scale] : Future reassessment of pain scale [Medication(s)] : Medication(s) [Never] : Never [Date Discussed (MM/DD/YY): ___] : Discussed: [unfilled] [With Patient/Caregiver] : with Patient/Caregiver [Abdominal Pain] : abdominal pain [Vomiting] : no vomiting [Constipation] : no constipation [Diarrhea Character] : Diarrhea: Grade 0

## 2025-01-21 NOTE — ASSESSMENT
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   In March 2024 he was admitted for worsening back pain and difficulty walking.   MRI spine showed cervical cord compression. Discussed to hold off surgery and follow with ortho closely. PSA elevated at 624. Urology consulted placed a stent in left ureter due to outside mass compression to the distal ureter, did not see any mass inside the left ureter. 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   On 4/1/24 pt was started on carbo/etoposide/tecentriq, plan for 4 cycles. Received first dose of lupron 4/3/24.  5/28/24 DEXA scan showed normal density.   9/10/24 CT CAP: Diffuse metastatic disease of the bones is markedly increased since prior exams. Previously noted enlarged hilar lymph nodes are now normal in size. Delayed left nephrogram with mild to moderate left hydroureter and hydronephrosis as well as some mucosal hyperemia (of infectious or inflammatory etiology). Previously present left perinephric stranding has markedly decreased.  9/10/24 NM Bone Scan: Compared to the bone scan in 3/2024, there is overall less intense and less heterogeneous foci of increased activity in the skeleton suggesting partial favorable response to therapy.   Plan   Prostate neuroendocrine adenocarcinoma: - Continue Lupron [id number], last given 1/21/25, next due April 2025.  - Foundation tissue Dx: BRCA2 loss, BRIP *, myc amplification, Rb loss and p53 mutation - Continue abiraterone 1,000mg daily + prednisone 5mg daily and olaparib 300mg bid. Reviewed potential AEs not limited to hypertension, abnormal liver function, edema, arthralgia, diarrhea, skin rash, hyperglycemia, cytopenias, fatigue, nausea/vomiting, decreased appetite.  - PSA lennie rapidly from 57.3 on 9/3/24 to 159 on 9/25/24. Most recently PSA lennie to 359 on 11/6/24, down to 219 on 11/26/24, 151 on 12/23/24. Repeat PSA today is pending.  - Clinically he's had a good response with decreased pain in the setting of a declining PSA and chromograin A. Repeat CT chest/abd/pelvis and bone scan ordered at last visit, pt instructed to schedule asap.   Bone mets: - Dexa scan on 5/29/24 reports normal bone density. 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 Potential side effects reviewed including but not limited to hypocalcemia and ONJ. Instructed to obtain dental clearance prior to start of Xgeva, he reportedly needs 12 teeth extracted, he will advise us once complete.   Pain  - Cancer related pains of the left upper back and joints are improved, max pain is 4/10. Taking PRN oxycodone 5mg up to 2x/day with good effect. - Continue PRN Tylenol per package insert and PRN oxycodone 5mg q6hrs for moderate to severe pain. Bowel regimen in place.   Physical debilitation: - Requires assistance with bathing and dressing. Unsteady balance.  - Referral for home PT requested.   Instructed to contact our office with any new/worsening symptoms. Pt and family educated regarding plan of care, all questions/concerns addressed to the best of my abilities and their apparent satisfaction. F/u in 4wks

## 2025-01-21 NOTE — BEGINNING OF VISIT
[0] : 2) Feeling down, depressed, or hopeless: Not at all (0) [PHQ-2 Negative] : PHQ-2 Negative [[id number]] : 0 [Pain Scale: ___] : On a scale of 1-10, today the patient's pain is a(n) [unfilled]. [Future Reassessment of Pain Scale] : Future reassessment of pain scale [Medication(s)] : Medication(s) [Never] : Never [Date Discussed (MM/DD/YY): ___] : Discussed: [unfilled] [With Patient/Caregiver] : with Patient/Caregiver [Abdominal Pain] : abdominal pain [Vomiting] : no vomiting [Constipation] : no constipation [Diarrhea Character] : Diarrhea: Grade 0

## 2025-01-21 NOTE — PHYSICAL EXAM
[Ambulatory and capable of all self care but unable to carry out any work activities] : Status 2- Ambulatory and capable of all self care but unable to carry out any work activities. Up and about more than 50% of waking hours [Normal] : affect appropriate [de-identified] : anicteric  [de-identified] : no LE edema

## 2025-01-21 NOTE — ASSESSMENT
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   In March 2024 he was admitted for worsening back pain and difficulty walking.   MRI spine showed cervical cord compression. Discussed to hold off surgery and follow with ortho closely. PSA elevated at 624. Urology consulted placed a stent in left ureter due to outside mass compression to the distal ureter, did not see any mass inside the left ureter. 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   On 4/1/24 pt was started on carbo/etoposide/tecentriq, plan for 4 cycles. Received first dose of lupron 4/3/24.  5/28/24 DEXA scan showed normal density.   9/10/24 CT CAP: Diffuse metastatic disease of the bones is markedly increased since prior exams. Previously noted enlarged hilar lymph nodes are now normal in size. Delayed left nephrogram with mild to moderate left hydroureter and hydronephrosis as well as some mucosal hyperemia (of infectious or inflammatory etiology). Previously present left perinephric stranding has markedly decreased.  9/10/24 NM Bone Scan: Compared to the bone scan in 3/2024, there is overall less intense and less heterogeneous foci of increased activity in the skeleton suggesting partial favorable response to therapy.   Plan   Prostate neuroendocrine adenocarcinoma: - Continue Lupron [id number], last given 1/21/25, next due April 2025.  - Foundation tissue Dx: BRCA2 loss, BRIP *, myc amplification, Rb loss and p53 mutation - Continue abiraterone 1,000mg daily + prednisone 5mg daily and olaparib 300mg bid. Reviewed potential AEs not limited to hypertension, abnormal liver function, edema, arthralgia, diarrhea, skin rash, hyperglycemia, cytopenias, fatigue, nausea/vomiting, decreased appetite.  - PSA lennie rapidly from 57.3 on 9/3/24 to 159 on 9/25/24. Most recently PSA lennie to 359 on 11/6/24, down to 219 on 11/26/24, 151 on 12/23/24. Repeat PSA today is pending.  - Clinically he's had a good response with decreased pain in the setting of a declining PSA and chromograin A. Repeat CT chest/abd/pelvis and bone scan ordered at last visit, pt instructed to schedule asap.   Bone mets: - Dexa scan on 5/29/24 reports normal bone density. 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 Potential side effects reviewed including but not limited to hypocalcemia and ONJ. Instructed to obtain dental clearance prior to start of Xgeva, he reportedly needs 12 teeth extracted, he will advise us once complete.   Pain  - Cancer related pains of the left upper back and joints are improved, max pain is 4/10. Taking PRN oxycodone 5mg up to 2x/day with good effect. - Continue PRN Tylenol per package insert and PRN oxycodone 5mg q6hrs for moderate to severe pain. Bowel regimen in place.   Physical debilitation: - Requires assistance with bathing and dressing. Unsteady balance.  - Referral for home PT requested.   Instructed to contact our office with any new/worsening symptoms. Pt and family educated regarding plan of care, all questions/concerns addressed to the best of my abilities and their apparent satisfaction. F/u in 4wks

## 2025-02-28 NOTE — HISTORY OF PRESENT ILLNESS
[de-identified] : Mr. Marti is a 62 yo M with PMHx of HTN, HLD, DM on metformin, chronic left shoulder/knee/hip joint pain; he had the initial medical oncology consultation for finding of left kidney on 3/1/24:  He initially started having inermittent left flank pain rated as 7/10 lasting several hours. He went to [facility] and admitted for 2 days (AMA on Feb 10th 2024 ); he had CT abd/p done there. He was told that he needs nephrectomy but he denied since "his mother had similar situation; she had nephrectomy but pathology was benign". He went to [facility] ER on 2/11/24 and had CT abd/p done. He was discharged from ER on the same day. He reports intermittent generalized fatigue, poor appetite for several months, and lost 30 lb in 8 months. He admits intermittent bone/muscle pain for several months. Denied hematuria, urine difficulty, fever, SOB at rest. He is able to walk 1-2 blocks but has   SOB on exertion.    -2/9/24 CT abd/p with IV contrast showing moderate left hydroureteronephrosis extending to the left distal ureter with a previous left ureteral mass. Findings suggestive of rupture of the left renal pelvis with prominent edema and fluid around the left kidney as well as delayed left nephrogram. Multiple enlarged retroperitoneal lymph nodes suspicious for metastases. Multiple scattered lytic and osseous sclerotic lesions concerning for metastatic disease.   -2/9/24 wbc 5.4 Hb 12.1 MCV 78.6% plt 266; K5.3 Cr 0.9 BUN 19 ; Ca 8.5 ALT 79; AST 67  albumin 3.3 Glucose 463.  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 Associated upstream mild hydroureteronephrosis. Extensive perinephric/periureteral stranding and fluid may reflect associated renal pelvic calyceal rupture as reported. Nonspecific renal pelvis and ureteral enhancement as well as bladder wall thickening. Recommend correlation with urinalysis for superimposed infection. Evidence of cheryl and osseous metastatic disease.  3/1/2024 patient had initial consultation and recommended for tissue biopsy, referred to urology. Percocet was prescribed for back pain.  3/1/24 CTA showed no pulmonary embolus, nonspecific bilateral hilar lymph nodes. diffuse skeletal metastases. 3/8/2024 Pt had Bone scan which shows widespread extensive skeletal metastasis.    Medication glipiz//metformin 5-500mg two tablets  bid  losartan one tablet once daily  jardiance 10mg daily   PSHx appendectomy >30-40 yrs ago SHx Denies ETOH/smoking. 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  FHx Denies family history of cancers/malignancies.  Allergy NKDA   3/8 Patient p/w an urgent visit. Son (Conner) and daughter(Ingrid) present. According to patient and family, patient has been having worsening pain, especially left lower back and pelvic pressure which causes left leg numbness. He was not able to walk properly for 3 days now.  Feels nausea and not able to eat well, constipated, groin pressure, difficulty urination. Percocet 5/325 does not improve his pain. plan to see urology (Dr. Jefferson) on 3/19 for tissue biopsy consultation.  3/25 he was admitted to hospital b/o rapidly worsening left flank, back pain and difficulty walking rapidly worsening left flank, back pain and difficulty walking  MRI spine showed cervical cord compression. Discussed to hold off surgery and follow with ortho closely. PSA elevated at 624. Urology consulted placed a stent in left ureter due to outside mass compression to the distal ureter, did not see any mass inside the left ureter. 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 Reports his left shoulder pain was resolved, left hip pain and lower back pain was improved. HIs dexamethasone was tapered off. He is on percocet 5-325 1tab every 88hours as needed and lidocaine topical film.   4/19/24: Patient has b/l leg pain. He says he has had this pain since diagnosis, but feels it has worsened in the last week. He feels the pain when he walks, it is a throbbing sensation in his knees and calves. Patient endorses ongoing numbness in toes, no change from prior, says massage helps. Patient reports muscle twitching in arms and legs, family member endorses that they can visibly see muscles tensing.   Patient reports pain in L upper back near scapula, says it radiates around his axilla to his chest. This pain has been there for the past week. Patient feels the pain at rest and it is worse when he moves. On a scale of 1-10, the pain is rated as a 5, when he takes percocet it  improves to a 4..   Patient endorses shortness of breath on exertion, says this developed in the last three weeks, can walk 50-60 feet before becoming short of breath. He endorses palpitations on exertion. He denies cough and shortness of breath at rest. He denies chest pain.   After chemotherapy patient felt more tired for about the first week, after that he got back to his normal routine but has been doing things a bit slower. Says appetite is okay. Has not had fever or chills. No nausea or vomiting. Is slightly constipated, will sometimes go two days without having a BM. Denies sores in mouth and changes in taste. Denies rashes, does have dry skin. Reports urinary urgency, denies hematuria and dysuria.    5/29/24 reports random chill, hot flash and sweating and fatigue, improving his pain in the left upper back, bilateral legs and left pelvis. ALso states nausea, denies constipation and diarrhea. Also reports skin itchiness in both lower legs in last 4 weeks.   7/1/24 reports feeling better after completing a total of 4 cycles of chemotherapy. He reports hot flashes and sweating, shortness of breath, minor memory loss, pain in the left shoulder blade when moving around. He eats better now.   7/22/24 today is feeling well, Endorses nausea, no vomiting but with occasional constipation. He also notes pimple like cyst around the anus likely external hemorrhoids. He will increase fiber and fluids intake. In the event he is experiencing discomfort then he can use OTC witch hezel to help ease pain. Tolerating Tencentriq/ LUPRON but endorses sweating and cold alternation but tolerable.   8/14/24 pt is doing well, not in acute distress. Dependent on assistive device to get around [cane]. Endorses improvement in anal discomfort since increasing fiber and fluids. Urine flow is strong, and empties bladder completely. Appetite is good and maintaining weight. He continues to take one tablet of oxycodone due to chronic left shoulder pain, and b/l Knee joint pain [provides adequate relieve]  8/30/24 reports left shoulder, left shoulder blade and new pain in the left lower rib cage pain. Had normal appetite and energy level.   9/25/24 Pt returns today with c/o chronic left shoulder pain during movement but not at rest and denies dyspnea.  Appetite is fair and has since lost ~ 7 pounds. Pt advised to eat small frequent meals. Abdominal is soft, ND, + BS. No diarrhea or constipation. Urinary flow is strong and empties bladder completely. denies hematuria or urgency. Nocturia 5 x day, pt to reduce fluid intake during evening hours. He also endorses difficulty completing ADLs task due to shoulder pain.   10/16/24 yesterday devloped a fever 102 relieved by tylenol. So far he is afebrile. Reports multiple joint pain including bilateral hip, elbow and right shoulder. Eats btter. Reports hot flash and no sweating. He is chair bound.   11/6/25 - abiraterone started late Sept 2024 + olaparib started mid Oct 2024. Notes fatigue, with 2-3 naps a day which is stable since before start of abiraterone and olaparib. Ongoing hot flashes. Dizziness and nausea with standing for too long. Needs assistance with ambulation, dressing, bathing. Can walk short distances with a 1 person assist, does not use a walker as it doesn't fit in his house. Appetite is stable. Mild SOB with exertion, resolves with rest. Urine flow and urgency are improved, 3-4x/night. Ongoing pain of left scapula and L posterior ribs, bilateral shoulders, bilateral knees, bilateral ankles, somewhat improved, max pain is 5/10. Taking PRN Percocet 5/325mg occasionally but not every day.   [de-identified] : prostate  [de-identified] : 11/26/24 - abiraterone started late Sept 2024 + olaparib started mid Oct 2024. Ongoing fatigue is somewhat worse, can walk a couple feet with a one person assist before needing to stop and rest. Occasional dizziness with standing. Appetite is improved. Coughing for the past week. Urine flow is "good", urgency at times, 3-4x/night. Notes increased pain in the joints (shoulders, elbows, hips, knees, ankles). Notes a shock like pain in the left upper back/scapula and rib/flank area. All pains are worse with movement and improves with lying down. Max pain is 7/10 with movement. Takes PRN Tylenol for mild pain and Percocet 5-325mg 1 tab per day with good effect.   12/23/24 - abiraterone started late Sept 2024 + olaparib started mid Oct 2024. Ongoing fatigue. Unsteady balance and dizziness at times. Fell on Saturday, he was climbing the second step of stairs and fell back landing on his right shoulder and right wrist. R wrist is swollen with decreased mobility, max pain is 9/10. Left upper back and shoulder pain are somewhat improved, max pain is 3/10. Taking PRN oxycodone 5mg up to 2-3x/day for R wrist pain, not for back pain. Urine flow is "good", notes urgency with occasional leakage., nocturia 4x/night. Denies fever, chills, night sweats, hot flashes, headache, chest pain, palpitations, SOB, cough, nausea/vomiting, diarrhea/constipation, abdominal pain, dysuria, hematuria, LE edema, rash/pruritus, bleeding  1/21/25 - abiraterone started late Sept 2024 + olaparib started mid Oct 2024. Some fatigue at times. Ongoing pains of L scapula and joints are much improved, max pain is 4/10. Using PRN oxycodone 5mg 1-2x/day with good effect. Notes tooth pain, was seen at the dental clinic and reportedly needs 12 extractions done, he is trying to have denture molds made prior to extractions. Intermittent hot flashes. Mild SOB with exertion, resolves with rest. Appetite is "good". Occasional nausea, PRN Zofran is helpful. L sided abdominal pain at times which he attributes to the ureteral stent which is due to be exchanged. Notes urine urgency, occasional hematuria, last episode was a couple weeks ago, some leakage at times, nocturia 4x/night. Denies fever, chills, night sweats, headache, dizziness, chest pain, palpitations, cough, vomiting, diarrhea/constipation, dysuria, incontinence, LE edema, rash/pruritus, bleeding.   2/28/25 reports moderate fatigue, sometimes loose stool 5 times a day, resolved its denies nausea, vomiting and constipation. Reports left ureteral stricture s/p stent placement 3 days ago with Dr. Benavides. His back pain is the same, and left hip pain is better, bilateral shoulder pain gets worse.

## 2025-02-28 NOTE — PHYSICAL EXAM
[Ambulatory and capable of all self care but unable to carry out any work activities] : Status 2- Ambulatory and capable of all self care but unable to carry out any work activities. Up and about more than 50% of waking hours [Normal] : affect appropriate [de-identified] : anicteric  [de-identified] : no LE edema

## 2025-02-28 NOTE — ASSESSMENT
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   In March 2024 he was admitted for worsening back pain and difficulty walking.   MRI spine showed cervical cord compression. Discussed to hold off surgery and follow with ortho closely. PSA elevated at 624. Urology consulted placed a stent in left ureter due to outside mass compression to the distal ureter, did not see any mass inside the left ureter. 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   On 4/1/24 pt was started on carbo/etoposide/tecentriq, plan for 4 cycles. Received first dose of lupron 4/3/24.  5/28/24 DEXA scan showed normal density.   9/10/24 CT CAP: Diffuse metastatic disease of the bones is markedly increased since prior exams. Previously noted enlarged hilar lymph nodes are now normal in size. Delayed left nephrogram with mild to moderate left hydroureter and hydronephrosis as well as some mucosal hyperemia (of infectious or inflammatory etiology). Previously present left perinephric stranding has markedly decreased.  9/10/24 NM Bone Scan: Compared to the bone scan in 3/2024, there is overall less intense and less heterogeneous foci of increased activity in the skeleton suggesting partial favorable response to therapy.   2/13/25 CT c/a/p showed Diffuse metastatic disease of the bones is more prominent since the prior exam No evidence of mediastinal lymphadenopathy. Previously noted slightly more prominent moderate left hydroureter and hydronephrosis with persistent delayed left nephrogram. Bone scan showed Again interval improvement in patchy uptake along the axial and appendicular skeleton. No new bony lesions.   Plan   Prostate neuroendocrine adenocarcinoma: - Continue Lupron [id number], last given 1/21/25, next due April 2025.  - Foundation tissue Dx: BRCA2 loss, BRIP *, myc amplification, Rb loss and p53 mutation - Continue abiraterone 1,000mg daily + prednisone 5mg daily and olaparib 300mg bid. Reviewed potential AEs not limited to hypertension, abnormal liver function, edema, arthralgia, diarrhea, skin rash, hyperglycemia, cytopenias, fatigue, nausea/vomiting, decreased appetite.  - PSA lennie rapidly from 57.3 on 9/3/24 to 159 on 9/25/24. Most recently PSA lennie to 359 on 11/6/24, down to 219 on 11/26/24, 151 on 12/23/24. Repeat PSA today  - Clinically he's had a good response with decreased pain in the setting of a declining PSA and chromograin A. Repeat CT chest/abd/pelvis and bone scan in 3 months, May 2025    Bone mets: - Dexa scan on 5/29/24 reports normal bone density. 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 Potential side effects reviewed including but not limited to hypocalcemia and ONJ. Instructed to obtain dental clearance prior to start of Xgeva, he reportedly needs 12 teeth extracted, he will advise us once complete.   Pain  - Cancer related pains of the left upper back and joints are improved, max pain is 4/10. Taking PRN oxycodone 5mg up to 2x/day with good effect. - Continue PRN Tylenol per package insert and PRN oxycodone 5mg q6hrs for moderate to severe pain. Bowel regimen in place.   Physical debilitation: - Requires assistance with bathing and dressing. Unsteady balance.  - Referral for home PT requested.   Instructed to contact our office with any new/worsening symptoms. Pt and family educated regarding plan of care, all questions/concerns addressed to the best of my abilities and their apparent satisfaction. F/u in 4wks

## 2025-02-28 NOTE — HISTORY OF PRESENT ILLNESS
[de-identified] : Mr. Marti is a 62 yo M with PMHx of HTN, HLD, DM on metformin, chronic left shoulder/knee/hip joint pain; he had the initial medical oncology consultation for finding of left kidney on 3/1/24:  He initially started having inermittent left flank pain rated as 7/10 lasting several hours. He went to [facility] and admitted for 2 days (AMA on Feb 10th 2024 ); he had CT abd/p done there. He was told that he needs nephrectomy but he denied since "his mother had similar situation; she had nephrectomy but pathology was benign". He went to [facility] ER on 2/11/24 and had CT abd/p done. He was discharged from ER on the same day. He reports intermittent generalized fatigue, poor appetite for several months, and lost 30 lb in 8 months. He admits intermittent bone/muscle pain for several months. Denied hematuria, urine difficulty, fever, SOB at rest. He is able to walk 1-2 blocks but has   SOB on exertion.    -2/9/24 CT abd/p with IV contrast showing moderate left hydroureteronephrosis extending to the left distal ureter with a previous left ureteral mass. Findings suggestive of rupture of the left renal pelvis with prominent edema and fluid around the left kidney as well as delayed left nephrogram. Multiple enlarged retroperitoneal lymph nodes suspicious for metastases. Multiple scattered lytic and osseous sclerotic lesions concerning for metastatic disease.   -2/9/24 wbc 5.4 Hb 12.1 MCV 78.6% plt 266; K5.3 Cr 0.9 BUN 19 ; Ca 8.5 ALT 79; AST 67  albumin 3.3 Glucose 463.  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 Associated upstream mild hydroureteronephrosis. Extensive perinephric/periureteral stranding and fluid may reflect associated renal pelvic calyceal rupture as reported. Nonspecific renal pelvis and ureteral enhancement as well as bladder wall thickening. Recommend correlation with urinalysis for superimposed infection. Evidence of cheryl and osseous metastatic disease.  3/1/2024 patient had initial consultation and recommended for tissue biopsy, referred to urology. Percocet was prescribed for back pain.  3/1/24 CTA showed no pulmonary embolus, nonspecific bilateral hilar lymph nodes. diffuse skeletal metastases. 3/8/2024 Pt had Bone scan which shows widespread extensive skeletal metastasis.    Medication glipiz//metformin 5-500mg two tablets  bid  losartan one tablet once daily  jardiance 10mg daily   PSHx appendectomy >30-40 yrs ago SHx Denies ETOH/smoking. 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  FHx Denies family history of cancers/malignancies.  Allergy NKDA   3/8 Patient p/w an urgent visit. Son (Conner) and daughter(Ingrid) present. According to patient and family, patient has been having worsening pain, especially left lower back and pelvic pressure which causes left leg numbness. He was not able to walk properly for 3 days now.  Feels nausea and not able to eat well, constipated, groin pressure, difficulty urination. Percocet 5/325 does not improve his pain. plan to see urology (Dr. Jefferson) on 3/19 for tissue biopsy consultation.  3/25 he was admitted to hospital b/o rapidly worsening left flank, back pain and difficulty walking rapidly worsening left flank, back pain and difficulty walking  MRI spine showed cervical cord compression. Discussed to hold off surgery and follow with ortho closely. PSA elevated at 624. Urology consulted placed a stent in left ureter due to outside mass compression to the distal ureter, did not see any mass inside the left ureter. 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 Reports his left shoulder pain was resolved, left hip pain and lower back pain was improved. HIs dexamethasone was tapered off. He is on percocet 5-325 1tab every 88hours as needed and lidocaine topical film.   4/19/24: Patient has b/l leg pain. He says he has had this pain since diagnosis, but feels it has worsened in the last week. He feels the pain when he walks, it is a throbbing sensation in his knees and calves. Patient endorses ongoing numbness in toes, no change from prior, says massage helps. Patient reports muscle twitching in arms and legs, family member endorses that they can visibly see muscles tensing.   Patient reports pain in L upper back near scapula, says it radiates around his axilla to his chest. This pain has been there for the past week. Patient feels the pain at rest and it is worse when he moves. On a scale of 1-10, the pain is rated as a 5, when he takes percocet it  improves to a 4..   Patient endorses shortness of breath on exertion, says this developed in the last three weeks, can walk 50-60 feet before becoming short of breath. He endorses palpitations on exertion. He denies cough and shortness of breath at rest. He denies chest pain.   After chemotherapy patient felt more tired for about the first week, after that he got back to his normal routine but has been doing things a bit slower. Says appetite is okay. Has not had fever or chills. No nausea or vomiting. Is slightly constipated, will sometimes go two days without having a BM. Denies sores in mouth and changes in taste. Denies rashes, does have dry skin. Reports urinary urgency, denies hematuria and dysuria.    5/29/24 reports random chill, hot flash and sweating and fatigue, improving his pain in the left upper back, bilateral legs and left pelvis. ALso states nausea, denies constipation and diarrhea. Also reports skin itchiness in both lower legs in last 4 weeks.   7/1/24 reports feeling better after completing a total of 4 cycles of chemotherapy. He reports hot flashes and sweating, shortness of breath, minor memory loss, pain in the left shoulder blade when moving around. He eats better now.   7/22/24 today is feeling well, Endorses nausea, no vomiting but with occasional constipation. He also notes pimple like cyst around the anus likely external hemorrhoids. He will increase fiber and fluids intake. In the event he is experiencing discomfort then he can use OTC witch hezel to help ease pain. Tolerating Tencentriq/ LUPRON but endorses sweating and cold alternation but tolerable.   8/14/24 pt is doing well, not in acute distress. Dependent on assistive device to get around [cane]. Endorses improvement in anal discomfort since increasing fiber and fluids. Urine flow is strong, and empties bladder completely. Appetite is good and maintaining weight. He continues to take one tablet of oxycodone due to chronic left shoulder pain, and b/l Knee joint pain [provides adequate relieve]  8/30/24 reports left shoulder, left shoulder blade and new pain in the left lower rib cage pain. Had normal appetite and energy level.   9/25/24 Pt returns today with c/o chronic left shoulder pain during movement but not at rest and denies dyspnea.  Appetite is fair and has since lost ~ 7 pounds. Pt advised to eat small frequent meals. Abdominal is soft, ND, + BS. No diarrhea or constipation. Urinary flow is strong and empties bladder completely. denies hematuria or urgency. Nocturia 5 x day, pt to reduce fluid intake during evening hours. He also endorses difficulty completing ADLs task due to shoulder pain.   10/16/24 yesterday devloped a fever 102 relieved by tylenol. So far he is afebrile. Reports multiple joint pain including bilateral hip, elbow and right shoulder. Eats btter. Reports hot flash and no sweating. He is chair bound.   11/6/25 - abiraterone started late Sept 2024 + olaparib started mid Oct 2024. Notes fatigue, with 2-3 naps a day which is stable since before start of abiraterone and olaparib. Ongoing hot flashes. Dizziness and nausea with standing for too long. Needs assistance with ambulation, dressing, bathing. Can walk short distances with a 1 person assist, does not use a walker as it doesn't fit in his house. Appetite is stable. Mild SOB with exertion, resolves with rest. Urine flow and urgency are improved, 3-4x/night. Ongoing pain of left scapula and L posterior ribs, bilateral shoulders, bilateral knees, bilateral ankles, somewhat improved, max pain is 5/10. Taking PRN Percocet 5/325mg occasionally but not every day.   [de-identified] : prostate  [de-identified] : 11/26/24 - abiraterone started late Sept 2024 + olaparib started mid Oct 2024. Ongoing fatigue is somewhat worse, can walk a couple feet with a one person assist before needing to stop and rest. Occasional dizziness with standing. Appetite is improved. Coughing for the past week. Urine flow is "good", urgency at times, 3-4x/night. Notes increased pain in the joints (shoulders, elbows, hips, knees, ankles). Notes a shock like pain in the left upper back/scapula and rib/flank area. All pains are worse with movement and improves with lying down. Max pain is 7/10 with movement. Takes PRN Tylenol for mild pain and Percocet 5-325mg 1 tab per day with good effect.   12/23/24 - abiraterone started late Sept 2024 + olaparib started mid Oct 2024. Ongoing fatigue. Unsteady balance and dizziness at times. Fell on Saturday, he was climbing the second step of stairs and fell back landing on his right shoulder and right wrist. R wrist is swollen with decreased mobility, max pain is 9/10. Left upper back and shoulder pain are somewhat improved, max pain is 3/10. Taking PRN oxycodone 5mg up to 2-3x/day for R wrist pain, not for back pain. Urine flow is "good", notes urgency with occasional leakage., nocturia 4x/night. Denies fever, chills, night sweats, hot flashes, headache, chest pain, palpitations, SOB, cough, nausea/vomiting, diarrhea/constipation, abdominal pain, dysuria, hematuria, LE edema, rash/pruritus, bleeding  1/21/25 - abiraterone started late Sept 2024 + olaparib started mid Oct 2024. Some fatigue at times. Ongoing pains of L scapula and joints are much improved, max pain is 4/10. Using PRN oxycodone 5mg 1-2x/day with good effect. Notes tooth pain, was seen at the dental clinic and reportedly needs 12 extractions done, he is trying to have denture molds made prior to extractions. Intermittent hot flashes. Mild SOB with exertion, resolves with rest. Appetite is "good". Occasional nausea, PRN Zofran is helpful. L sided abdominal pain at times which he attributes to the ureteral stent which is due to be exchanged. Notes urine urgency, occasional hematuria, last episode was a couple weeks ago, some leakage at times, nocturia 4x/night. Denies fever, chills, night sweats, headache, dizziness, chest pain, palpitations, cough, vomiting, diarrhea/constipation, dysuria, incontinence, LE edema, rash/pruritus, bleeding.   2/28/25 reports moderate fatigue, sometimes loose stool 5 times a day, resolved its denies nausea, vomiting and constipation. Reports left ureteral stricture s/p stent placement 3 days ago with Dr. Benavides. His back pain is the same, and left hip pain is better, bilateral shoulder pain gets worse.

## 2025-02-28 NOTE — REVIEW OF SYSTEMS
[Fatigue] : fatigue [SOB on Exertion] : shortness of breath during exertion [Diarrhea: Grade 0] : Diarrhea: Grade 0 [Incontinence] : incontinence [Joint Pain] : joint pain [Joint Stiffness] : joint stiffness [Muscle Pain] : muscle pain [Hot Flashes] : hot flashes [Fever] : no fever [Chills] : no chills [Night Sweats] : no night sweats [Chest Pain] : no chest pain [Palpitations] : no palpitations [Lower Ext Edema] : no lower extremity edema [Cough] : no cough [Abdominal Pain] : no abdominal pain [Vomiting] : no vomiting [Constipation] : no constipation [Dysuria] : no dysuria [Muscle Weakness] : no muscle weakness [Dizziness] : no dizziness [Easy Bleeding] : no tendency for easy bleeding [Easy Bruising] : no tendency for easy bruising [FreeTextEntry9] : see HPI

## 2025-02-28 NOTE — PHYSICAL EXAM
[Ambulatory and capable of all self care but unable to carry out any work activities] : Status 2- Ambulatory and capable of all self care but unable to carry out any work activities. Up and about more than 50% of waking hours [Normal] : affect appropriate [de-identified] : anicteric  [de-identified] : no LE edema

## 2025-02-28 NOTE — ASSESSMENT
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   In March 2024 he was admitted for worsening back pain and difficulty walking.   MRI spine showed cervical cord compression. Discussed to hold off surgery and follow with ortho closely. PSA elevated at 624. Urology consulted placed a stent in left ureter due to outside mass compression to the distal ureter, did not see any mass inside the left ureter. 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   On 4/1/24 pt was started on carbo/etoposide/tecentriq, plan for 4 cycles. Received first dose of lupron 4/3/24.  5/28/24 DEXA scan showed normal density.   9/10/24 CT CAP: Diffuse metastatic disease of the bones is markedly increased since prior exams. Previously noted enlarged hilar lymph nodes are now normal in size. Delayed left nephrogram with mild to moderate left hydroureter and hydronephrosis as well as some mucosal hyperemia (of infectious or inflammatory etiology). Previously present left perinephric stranding has markedly decreased.  9/10/24 NM Bone Scan: Compared to the bone scan in 3/2024, there is overall less intense and less heterogeneous foci of increased activity in the skeleton suggesting partial favorable response to therapy.   2/13/25 CT c/a/p showed Diffuse metastatic disease of the bones is more prominent since the prior exam No evidence of mediastinal lymphadenopathy. Previously noted slightly more prominent moderate left hydroureter and hydronephrosis with persistent delayed left nephrogram. Bone scan showed Again interval improvement in patchy uptake along the axial and appendicular skeleton. No new bony lesions.   Plan   Prostate neuroendocrine adenocarcinoma: - Continue Lupron [id number], last given 1/21/25, next due April 2025.  - Foundation tissue Dx: BRCA2 loss, BRIP *, myc amplification, Rb loss and p53 mutation - Continue abiraterone 1,000mg daily + prednisone 5mg daily and olaparib 300mg bid. Reviewed potential AEs not limited to hypertension, abnormal liver function, edema, arthralgia, diarrhea, skin rash, hyperglycemia, cytopenias, fatigue, nausea/vomiting, decreased appetite.  - PSA lennie rapidly from 57.3 on 9/3/24 to 159 on 9/25/24. Most recently PSA lennie to 359 on 11/6/24, down to 219 on 11/26/24, 151 on 12/23/24. Repeat PSA today  - Clinically he's had a good response with decreased pain in the setting of a declining PSA and chromograin A. Repeat CT chest/abd/pelvis and bone scan in 3 months, May 2025    Bone mets: - Dexa scan on 5/29/24 reports normal bone density. 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 Potential side effects reviewed including but not limited to hypocalcemia and ONJ. Instructed to obtain dental clearance prior to start of Xgeva, he reportedly needs 12 teeth extracted, he will advise us once complete.   Pain  - Cancer related pains of the left upper back and joints are improved, max pain is 4/10. Taking PRN oxycodone 5mg up to 2x/day with good effect. - Continue PRN Tylenol per package insert and PRN oxycodone 5mg q6hrs for moderate to severe pain. Bowel regimen in place.   Physical debilitation: - Requires assistance with bathing and dressing. Unsteady balance.  - Referral for home PT requested.   Instructed to contact our office with any new/worsening symptoms. Pt and family educated regarding plan of care, all questions/concerns addressed to the best of my abilities and their apparent satisfaction. F/u in 4wks

## 2025-04-01 NOTE — HISTORY OF PRESENT ILLNESS
[de-identified] : Mr. Marti is a 62 yo M with PMHx of HTN, HLD, DM on metformin, chronic left shoulder/knee/hip joint pain; he had the initial medical oncology consultation for finding of left kidney on 3/1/24:  He initially started having inermittent left flank pain rated as 7/10 lasting several hours. He went to [facility] and admitted for 2 days (AMA on Feb 10th 2024 ); he had CT abd/p done there. He was told that he needs nephrectomy but he denied since "his mother had similar situation; she had nephrectomy but pathology was benign". He went to [facility] ER on 2/11/24 and had CT abd/p done. He was discharged from ER on the same day. He reports intermittent generalized fatigue, poor appetite for several months, and lost 30 lb in 8 months. He admits intermittent bone/muscle pain for several months. Denied hematuria, urine difficulty, fever, SOB at rest. He is able to walk 1-2 blocks but has   SOB on exertion.    -2/9/24 CT abd/p with IV contrast showing moderate left hydroureteronephrosis extending to the left distal ureter with a previous left ureteral mass. Findings suggestive of rupture of the left renal pelvis with prominent edema and fluid around the left kidney as well as delayed left nephrogram. Multiple enlarged retroperitoneal lymph nodes suspicious for metastases. Multiple scattered lytic and osseous sclerotic lesions concerning for metastatic disease.   -2/9/24 wbc 5.4 Hb 12.1 MCV 78.6% plt 266; K5.3 Cr 0.9 BUN 19 ; Ca 8.5 ALT 79; AST 67  albumin 3.3 Glucose 463.  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 Associated upstream mild hydroureteronephrosis. Extensive perinephric/periureteral stranding and fluid may reflect associated renal pelvic calyceal rupture as reported. Nonspecific renal pelvis and ureteral enhancement as well as bladder wall thickening. Recommend correlation with urinalysis for superimposed infection. Evidence of cheryl and osseous metastatic disease.  3/1/2024 patient had initial consultation and recommended for tissue biopsy, referred to urology. Percocet was prescribed for back pain.  3/1/24 CTA showed no pulmonary embolus, nonspecific bilateral hilar lymph nodes. diffuse skeletal metastases. 3/8/2024 Pt had Bone scan which shows widespread extensive skeletal metastasis.    Medication glipiz//metformin 5-500mg two tablets  bid  losartan one tablet once daily  jardiance 10mg daily   PSHx appendectomy >30-40 yrs ago SHx Denies ETOH/smoking. 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  FHx Denies family history of cancers/malignancies.  Allergy NKDA   3/8 Patient p/w an urgent visit. Son (Conner) and daughter(Ingrid) present. According to patient and family, patient has been having worsening pain, especially left lower back and pelvic pressure which causes left leg numbness. He was not able to walk properly for 3 days now.  Feels nausea and not able to eat well, constipated, groin pressure, difficulty urination. Percocet 5/325 does not improve his pain. plan to see urology (Dr. Jefferson) on 3/19 for tissue biopsy consultation.  3/25 he was admitted to hospital b/o rapidly worsening left flank, back pain and difficulty walking rapidly worsening left flank, back pain and difficulty walking  MRI spine showed cervical cord compression. Discussed to hold off surgery and follow with ortho closely. PSA elevated at 624. Urology consulted placed a stent in left ureter due to outside mass compression to the distal ureter, did not see any mass inside the left ureter. 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 Reports his left shoulder pain was resolved, left hip pain and lower back pain was improved. HIs dexamethasone was tapered off. He is on percocet 5-325 1tab every 88hours as needed and lidocaine topical film.   4/19/24: Patient has b/l leg pain. He says he has had this pain since diagnosis, but feels it has worsened in the last week. He feels the pain when he walks, it is a throbbing sensation in his knees and calves. Patient endorses ongoing numbness in toes, no change from prior, says massage helps. Patient reports muscle twitching in arms and legs, family member endorses that they can visibly see muscles tensing.   Patient reports pain in L upper back near scapula, says it radiates around his axilla to his chest. This pain has been there for the past week. Patient feels the pain at rest and it is worse when he moves. On a scale of 1-10, the pain is rated as a 5, when he takes percocet it  improves to a 4..   Patient endorses shortness of breath on exertion, says this developed in the last three weeks, can walk 50-60 feet before becoming short of breath. He endorses palpitations on exertion. He denies cough and shortness of breath at rest. He denies chest pain.   After chemotherapy patient felt more tired for about the first week, after that he got back to his normal routine but has been doing things a bit slower. Says appetite is okay. Has not had fever or chills. No nausea or vomiting. Is slightly constipated, will sometimes go two days without having a BM. Denies sores in mouth and changes in taste. Denies rashes, does have dry skin. Reports urinary urgency, denies hematuria and dysuria.    5/29/24 reports random chill, hot flash and sweating and fatigue, improving his pain in the left upper back, bilateral legs and left pelvis. ALso states nausea, denies constipation and diarrhea. Also reports skin itchiness in both lower legs in last 4 weeks.   7/1/24 reports feeling better after completing a total of 4 cycles of chemotherapy. He reports hot flashes and sweating, shortness of breath, minor memory loss, pain in the left shoulder blade when moving around. He eats better now.   7/22/24 today is feeling well, Endorses nausea, no vomiting but with occasional constipation. He also notes pimple like cyst around the anus likely external hemorrhoids. He will increase fiber and fluids intake. In the event he is experiencing discomfort then he can use OTC witch hezel to help ease pain. Tolerating Tencentriq/ LUPRON but endorses sweating and cold alternation but tolerable.   8/14/24 pt is doing well, not in acute distress. Dependent on assistive device to get around [cane]. Endorses improvement in anal discomfort since increasing fiber and fluids. Urine flow is strong, and empties bladder completely. Appetite is good and maintaining weight. He continues to take one tablet of oxycodone due to chronic left shoulder pain, and b/l Knee joint pain [provides adequate relieve]  8/30/24 reports left shoulder, left shoulder blade and new pain in the left lower rib cage pain. Had normal appetite and energy level.   9/25/24 Pt returns today with c/o chronic left shoulder pain during movement but not at rest and denies dyspnea.  Appetite is fair and has since lost ~ 7 pounds. Pt advised to eat small frequent meals. Abdominal is soft, ND, + BS. No diarrhea or constipation. Urinary flow is strong and empties bladder completely. denies hematuria or urgency. Nocturia 5 x day, pt to reduce fluid intake during evening hours. He also endorses difficulty completing ADLs task due to shoulder pain.   10/16/24 yesterday devloped a fever 102 relieved by tylenol. So far he is afebrile. Reports multiple joint pain including bilateral hip, elbow and right shoulder. Eats btter. Reports hot flash and no sweating. He is chair bound.   11/6/25 - abiraterone started late Sept 2024 + olaparib started mid Oct 2024. Notes fatigue, with 2-3 naps a day which is stable since before start of abiraterone and olaparib. Ongoing hot flashes. Dizziness and nausea with standing for too long. Needs assistance with ambulation, dressing, bathing. Can walk short distances with a 1 person assist, does not use a walker as it doesn't fit in his house. Appetite is stable. Mild SOB with exertion, resolves with rest. Urine flow and urgency are improved, 3-4x/night. Ongoing pain of left scapula and L posterior ribs, bilateral shoulders, bilateral knees, bilateral ankles, somewhat improved, max pain is 5/10. Taking PRN Percocet 5/325mg occasionally but not every day.   [de-identified] : prostate  [de-identified] : 11/26/24 - abiraterone started late Sept 2024 + olaparib started mid Oct 2024. Ongoing fatigue is somewhat worse, can walk a couple feet with a one person assist before needing to stop and rest. Occasional dizziness with standing. Appetite is improved. Coughing for the past week. Urine flow is "good", urgency at times, 3-4x/night. Notes increased pain in the joints (shoulders, elbows, hips, knees, ankles). Notes a shock like pain in the left upper back/scapula and rib/flank area. All pains are worse with movement and improves with lying down. Max pain is 7/10 with movement. Takes PRN Tylenol for mild pain and Percocet 5-325mg 1 tab per day with good effect.   12/23/24 - abiraterone started late Sept 2024 + olaparib started mid Oct 2024. Ongoing fatigue. Unsteady balance and dizziness at times. Fell on Saturday, he was climbing the second step of stairs and fell back landing on his right shoulder and right wrist. R wrist is swollen with decreased mobility, max pain is 9/10. Left upper back and shoulder pain are somewhat improved, max pain is 3/10. Taking PRN oxycodone 5mg up to 2-3x/day for R wrist pain, not for back pain. Urine flow is "good", notes urgency with occasional leakage., nocturia 4x/night. Denies fever, chills, night sweats, hot flashes, headache, chest pain, palpitations, SOB, cough, nausea/vomiting, diarrhea/constipation, abdominal pain, dysuria, hematuria, LE edema, rash/pruritus, bleeding  1/21/25 - abiraterone started late Sept 2024 + olaparib started mid Oct 2024. Some fatigue at times. Ongoing pains of L scapula and joints are much improved, max pain is 4/10. Using PRN oxycodone 5mg 1-2x/day with good effect. Notes tooth pain, was seen at the dental clinic and reportedly needs 12 extractions done, he is trying to have denture molds made prior to extractions. Intermittent hot flashes. Mild SOB with exertion, resolves with rest. Appetite is "good". Occasional nausea, PRN Zofran is helpful. L sided abdominal pain at times which he attributes to the ureteral stent which is due to be exchanged. Notes urine urgency, occasional hematuria, last episode was a couple weeks ago, some leakage at times, nocturia 4x/night. Denies fever, chills, night sweats, headache, dizziness, chest pain, palpitations, cough, vomiting, diarrhea/constipation, dysuria, incontinence, LE edema, rash/pruritus, bleeding.   2/28/25 reports moderate fatigue, sometimes loose stool 5 times a day, resolved its denies nausea, vomiting and constipation. Reports left ureteral stricture s/p stent placement 3 days ago with Dr. Benavides. His back pain is the same, and left hip pain is better, bilateral shoulder pain gets worse.   3/31/25: Still having pain in his back, left shoulder/upper arm, and his left hip. Right shoulder pain has resolved. Numbness worsening in his left leg over the past month. No fever, chills, unintentional weight loss. PSA has been steadily decreasing, checking again today. Genetic counseling scheduled for next week. Still looking for a way to get him dentures as he needs 12 teeth extracted before he gets Xgeva.

## 2025-04-01 NOTE — ASSESSMENT
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   In March 2024 he was admitted for worsening back pain and difficulty walking.   MRI spine showed cervical cord compression. Discussed to hold off surgery and follow with ortho closely. PSA elevated at 624. Urology consulted placed a stent in left ureter due to outside mass compression to the distal ureter, did not see any mass inside the left ureter. 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   On 4/1/24 pt was started on carbo/etoposide/tecentriq, plan for 4 cycles. Received first dose of lupron 4/3/24.  5/28/24 DEXA scan showed normal density.   9/10/24 CT CAP: Diffuse metastatic disease of the bones is markedly increased since prior exams. Previously noted enlarged hilar lymph nodes are now normal in size. Delayed left nephrogram with mild to moderate left hydroureter and hydronephrosis as well as some mucosal hyperemia (of infectious or inflammatory etiology). Previously present left perinephric stranding has markedly decreased.  9/10/24 NM Bone Scan: Compared to the bone scan in 3/2024, there is overall less intense and less heterogeneous foci of increased activity in the skeleton suggesting partial favorable response to therapy.   2/13/25 CT c/a/p showed Diffuse metastatic disease of the bones is more prominent since the prior exam No evidence of mediastinal lymphadenopathy. Previously noted slightly more prominent moderate left hydroureter and hydronephrosis with persistent delayed left nephrogram. Bone scan showed Again interval improvement in patchy uptake along the axial and appendicular skeleton. No new bony lesions.   Plan   Prostate neuroendocrine adenocarcinoma: - Continue Lupron [id number], last given 1/21/25, next due April 2025.  - Foundation tissue Dx: BRCA2 loss, BRIP *, myc amplification, Rb loss and p53 mutation - Continue abiraterone 1,000mg daily + prednisone 5mg daily and olaparib 300mg bid. Reviewed potential AEs not limited to hypertension, abnormal liver function, edema, arthralgia, diarrhea, skin rash, hyperglycemia, cytopenias, fatigue, nausea/vomiting, decreased appetite.  - PSA lennie rapidly from 57.3 on 9/3/24 to 159 on 9/25/24. Most recently PSA lennie to 359 on 11/6/24, down to 219 on 11/26/24, 151 on 12/23/24, 99.70 on 1/21/25. Repeat PSA today  - Clinically he's had a good response with decreased pain in the setting of a declining PSA and chromogranin A. - Repeat CT chest/abd/pelvis and bone scan in May 2025    Bone mets: - Dexa scan on 5/29/24 reports normal bone density. 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 Potential side effects reviewed including but not limited to hypocalcemia and ONJ. Instructed to obtain dental clearance prior to start of Xgeva, he reportedly needs 12 teeth extracted. Still need to find a way to get him dentures; he will advise us once complete.  Pain  - Cancer related pains of the left upper back and joints are improved, max pain is 4/10. Taking PRN oxycodone 5mg up to 2x/day with good effect. - Continue PRN Tylenol per package insert and PRN oxycodone 5mg q8hrs for moderate to severe pain. Bowel regimen in place.   Instructed to contact our office with any new/worsening symptoms. Pt and family educated regarding plan of care, all questions/concerns addressed to the best of my abilities and their apparent satisfaction.  F/u in 6 wks  Patient seen and examined with Dr. Vergara.   Storm Durant M.D. Hematology/Oncology Fellow PGY-6 [facility]

## 2025-04-01 NOTE — HISTORY OF PRESENT ILLNESS
[de-identified] : Mr. Marti is a 62 yo M with PMHx of HTN, HLD, DM on metformin, chronic left shoulder/knee/hip joint pain; he had the initial medical oncology consultation for finding of left kidney on 3/1/24:  He initially started having inermittent left flank pain rated as 7/10 lasting several hours. He went to [facility] and admitted for 2 days (AMA on Feb 10th 2024 ); he had CT abd/p done there. He was told that he needs nephrectomy but he denied since "his mother had similar situation; she had nephrectomy but pathology was benign". He went to [facility] ER on 2/11/24 and had CT abd/p done. He was discharged from ER on the same day. He reports intermittent generalized fatigue, poor appetite for several months, and lost 30 lb in 8 months. He admits intermittent bone/muscle pain for several months. Denied hematuria, urine difficulty, fever, SOB at rest. He is able to walk 1-2 blocks but has   SOB on exertion.    -2/9/24 CT abd/p with IV contrast showing moderate left hydroureteronephrosis extending to the left distal ureter with a previous left ureteral mass. Findings suggestive of rupture of the left renal pelvis with prominent edema and fluid around the left kidney as well as delayed left nephrogram. Multiple enlarged retroperitoneal lymph nodes suspicious for metastases. Multiple scattered lytic and osseous sclerotic lesions concerning for metastatic disease.   -2/9/24 wbc 5.4 Hb 12.1 MCV 78.6% plt 266; K5.3 Cr 0.9 BUN 19 ; Ca 8.5 ALT 79; AST 67  albumin 3.3 Glucose 463.  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 Associated upstream mild hydroureteronephrosis. Extensive perinephric/periureteral stranding and fluid may reflect associated renal pelvic calyceal rupture as reported. Nonspecific renal pelvis and ureteral enhancement as well as bladder wall thickening. Recommend correlation with urinalysis for superimposed infection. Evidence of cheryl and osseous metastatic disease.  3/1/2024 patient had initial consultation and recommended for tissue biopsy, referred to urology. Percocet was prescribed for back pain.  3/1/24 CTA showed no pulmonary embolus, nonspecific bilateral hilar lymph nodes. diffuse skeletal metastases. 3/8/2024 Pt had Bone scan which shows widespread extensive skeletal metastasis.    Medication glipiz//metformin 5-500mg two tablets  bid  losartan one tablet once daily  jardiance 10mg daily   PSHx appendectomy >30-40 yrs ago SHx Denies ETOH/smoking. 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  FHx Denies family history of cancers/malignancies.  Allergy NKDA   3/8 Patient p/w an urgent visit. Son (Conner) and daughter(Ingrid) present. According to patient and family, patient has been having worsening pain, especially left lower back and pelvic pressure which causes left leg numbness. He was not able to walk properly for 3 days now.  Feels nausea and not able to eat well, constipated, groin pressure, difficulty urination. Percocet 5/325 does not improve his pain. plan to see urology (Dr. Jefferson) on 3/19 for tissue biopsy consultation.  3/25 he was admitted to hospital b/o rapidly worsening left flank, back pain and difficulty walking rapidly worsening left flank, back pain and difficulty walking  MRI spine showed cervical cord compression. Discussed to hold off surgery and follow with ortho closely. PSA elevated at 624. Urology consulted placed a stent in left ureter due to outside mass compression to the distal ureter, did not see any mass inside the left ureter. 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 Reports his left shoulder pain was resolved, left hip pain and lower back pain was improved. HIs dexamethasone was tapered off. He is on percocet 5-325 1tab every 88hours as needed and lidocaine topical film.   4/19/24: Patient has b/l leg pain. He says he has had this pain since diagnosis, but feels it has worsened in the last week. He feels the pain when he walks, it is a throbbing sensation in his knees and calves. Patient endorses ongoing numbness in toes, no change from prior, says massage helps. Patient reports muscle twitching in arms and legs, family member endorses that they can visibly see muscles tensing.   Patient reports pain in L upper back near scapula, says it radiates around his axilla to his chest. This pain has been there for the past week. Patient feels the pain at rest and it is worse when he moves. On a scale of 1-10, the pain is rated as a 5, when he takes percocet it  improves to a 4..   Patient endorses shortness of breath on exertion, says this developed in the last three weeks, can walk 50-60 feet before becoming short of breath. He endorses palpitations on exertion. He denies cough and shortness of breath at rest. He denies chest pain.   After chemotherapy patient felt more tired for about the first week, after that he got back to his normal routine but has been doing things a bit slower. Says appetite is okay. Has not had fever or chills. No nausea or vomiting. Is slightly constipated, will sometimes go two days without having a BM. Denies sores in mouth and changes in taste. Denies rashes, does have dry skin. Reports urinary urgency, denies hematuria and dysuria.    5/29/24 reports random chill, hot flash and sweating and fatigue, improving his pain in the left upper back, bilateral legs and left pelvis. ALso states nausea, denies constipation and diarrhea. Also reports skin itchiness in both lower legs in last 4 weeks.   7/1/24 reports feeling better after completing a total of 4 cycles of chemotherapy. He reports hot flashes and sweating, shortness of breath, minor memory loss, pain in the left shoulder blade when moving around. He eats better now.   7/22/24 today is feeling well, Endorses nausea, no vomiting but with occasional constipation. He also notes pimple like cyst around the anus likely external hemorrhoids. He will increase fiber and fluids intake. In the event he is experiencing discomfort then he can use OTC witch hezel to help ease pain. Tolerating Tencentriq/ LUPRON but endorses sweating and cold alternation but tolerable.   8/14/24 pt is doing well, not in acute distress. Dependent on assistive device to get around [cane]. Endorses improvement in anal discomfort since increasing fiber and fluids. Urine flow is strong, and empties bladder completely. Appetite is good and maintaining weight. He continues to take one tablet of oxycodone due to chronic left shoulder pain, and b/l Knee joint pain [provides adequate relieve]  8/30/24 reports left shoulder, left shoulder blade and new pain in the left lower rib cage pain. Had normal appetite and energy level.   9/25/24 Pt returns today with c/o chronic left shoulder pain during movement but not at rest and denies dyspnea.  Appetite is fair and has since lost ~ 7 pounds. Pt advised to eat small frequent meals. Abdominal is soft, ND, + BS. No diarrhea or constipation. Urinary flow is strong and empties bladder completely. denies hematuria or urgency. Nocturia 5 x day, pt to reduce fluid intake during evening hours. He also endorses difficulty completing ADLs task due to shoulder pain.   10/16/24 yesterday devloped a fever 102 relieved by tylenol. So far he is afebrile. Reports multiple joint pain including bilateral hip, elbow and right shoulder. Eats btter. Reports hot flash and no sweating. He is chair bound.   11/6/25 - abiraterone started late Sept 2024 + olaparib started mid Oct 2024. Notes fatigue, with 2-3 naps a day which is stable since before start of abiraterone and olaparib. Ongoing hot flashes. Dizziness and nausea with standing for too long. Needs assistance with ambulation, dressing, bathing. Can walk short distances with a 1 person assist, does not use a walker as it doesn't fit in his house. Appetite is stable. Mild SOB with exertion, resolves with rest. Urine flow and urgency are improved, 3-4x/night. Ongoing pain of left scapula and L posterior ribs, bilateral shoulders, bilateral knees, bilateral ankles, somewhat improved, max pain is 5/10. Taking PRN Percocet 5/325mg occasionally but not every day.   [de-identified] : prostate  [de-identified] : 11/26/24 - abiraterone started late Sept 2024 + olaparib started mid Oct 2024. Ongoing fatigue is somewhat worse, can walk a couple feet with a one person assist before needing to stop and rest. Occasional dizziness with standing. Appetite is improved. Coughing for the past week. Urine flow is "good", urgency at times, 3-4x/night. Notes increased pain in the joints (shoulders, elbows, hips, knees, ankles). Notes a shock like pain in the left upper back/scapula and rib/flank area. All pains are worse with movement and improves with lying down. Max pain is 7/10 with movement. Takes PRN Tylenol for mild pain and Percocet 5-325mg 1 tab per day with good effect.   12/23/24 - abiraterone started late Sept 2024 + olaparib started mid Oct 2024. Ongoing fatigue. Unsteady balance and dizziness at times. Fell on Saturday, he was climbing the second step of stairs and fell back landing on his right shoulder and right wrist. R wrist is swollen with decreased mobility, max pain is 9/10. Left upper back and shoulder pain are somewhat improved, max pain is 3/10. Taking PRN oxycodone 5mg up to 2-3x/day for R wrist pain, not for back pain. Urine flow is "good", notes urgency with occasional leakage., nocturia 4x/night. Denies fever, chills, night sweats, hot flashes, headache, chest pain, palpitations, SOB, cough, nausea/vomiting, diarrhea/constipation, abdominal pain, dysuria, hematuria, LE edema, rash/pruritus, bleeding  1/21/25 - abiraterone started late Sept 2024 + olaparib started mid Oct 2024. Some fatigue at times. Ongoing pains of L scapula and joints are much improved, max pain is 4/10. Using PRN oxycodone 5mg 1-2x/day with good effect. Notes tooth pain, was seen at the dental clinic and reportedly needs 12 extractions done, he is trying to have denture molds made prior to extractions. Intermittent hot flashes. Mild SOB with exertion, resolves with rest. Appetite is "good". Occasional nausea, PRN Zofran is helpful. L sided abdominal pain at times which he attributes to the ureteral stent which is due to be exchanged. Notes urine urgency, occasional hematuria, last episode was a couple weeks ago, some leakage at times, nocturia 4x/night. Denies fever, chills, night sweats, headache, dizziness, chest pain, palpitations, cough, vomiting, diarrhea/constipation, dysuria, incontinence, LE edema, rash/pruritus, bleeding.   2/28/25 reports moderate fatigue, sometimes loose stool 5 times a day, resolved its denies nausea, vomiting and constipation. Reports left ureteral stricture s/p stent placement 3 days ago with Dr. Benavides. His back pain is the same, and left hip pain is better, bilateral shoulder pain gets worse.   3/31/25: Still having pain in his back, left shoulder/upper arm, and his left hip. Right shoulder pain has resolved. Numbness worsening in his left leg over the past month. No fever, chills, unintentional weight loss. PSA has been steadily decreasing, checking again today. Genetic counseling scheduled for next week. Still looking for a way to get him dentures as he needs 12 teeth extracted before he gets Xgeva.

## 2025-04-01 NOTE — REVIEW OF SYSTEMS
[Fever] : no fever [Chills] : no chills [Night Sweats] : no night sweats [Chest Pain] : no chest pain [Palpitations] : no palpitations [Lower Ext Edema] : no lower extremity edema [Cough] : no cough [Abdominal Pain] : no abdominal pain [Vomiting] : no vomiting [Constipation] : no constipation [Dysuria] : no dysuria [Muscle Weakness] : no muscle weakness [Dizziness] : no dizziness [Easy Bleeding] : no tendency for easy bleeding [Easy Bruising] : no tendency for easy bruising [FreeTextEntry9] : see HPI

## 2025-04-01 NOTE — HISTORY OF PRESENT ILLNESS
[de-identified] : Mr. Marti is a 64 yo M with PMHx of HTN, HLD, DM on metformin, chronic left shoulder/knee/hip joint pain; he had the initial medical oncology consultation for finding of left kidney on 3/1/24:  He initially started having inermittent left flank pain rated as 7/10 lasting several hours. He went to [facility] and admitted for 2 days (AMA on Feb 10th 2024 ); he had CT abd/p done there. He was told that he needs nephrectomy but he denied since "his mother had similar situation; she had nephrectomy but pathology was benign". He went to [facility] ER on 2/11/24 and had CT abd/p done. He was discharged from ER on the same day. He reports intermittent generalized fatigue, poor appetite for several months, and lost 30 lb in 8 months. He admits intermittent bone/muscle pain for several months. Denied hematuria, urine difficulty, fever, SOB at rest. He is able to walk 1-2 blocks but has   SOB on exertion.    -2/9/24 CT abd/p with IV contrast showing moderate left hydroureteronephrosis extending to the left distal ureter with a previous left ureteral mass. Findings suggestive of rupture of the left renal pelvis with prominent edema and fluid around the left kidney as well as delayed left nephrogram. Multiple enlarged retroperitoneal lymph nodes suspicious for metastases. Multiple scattered lytic and osseous sclerotic lesions concerning for metastatic disease.   -2/9/24 wbc 5.4 Hb 12.1 MCV 78.6% plt 266; K5.3 Cr 0.9 BUN 19 ; Ca 8.5 ALT 79; AST 67  albumin 3.3 Glucose 463.  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 Associated upstream mild hydroureteronephrosis. Extensive perinephric/periureteral stranding and fluid may reflect associated renal pelvic calyceal rupture as reported. Nonspecific renal pelvis and ureteral enhancement as well as bladder wall thickening. Recommend correlation with urinalysis for superimposed infection. Evidence of cheryl and osseous metastatic disease.  3/1/2024 patient had initial consultation and recommended for tissue biopsy, referred to urology. Percocet was prescribed for back pain.  3/1/24 CTA showed no pulmonary embolus, nonspecific bilateral hilar lymph nodes. diffuse skeletal metastases. 3/8/2024 Pt had Bone scan which shows widespread extensive skeletal metastasis.    Medication glipiz//metformin 5-500mg two tablets  bid  losartan one tablet once daily  jardiance 10mg daily   PSHx appendectomy >30-40 yrs ago SHx Denies ETOH/smoking. 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  FHx Denies family history of cancers/malignancies.  Allergy NKDA   3/8 Patient p/w an urgent visit. Son (Conner) and daughter(Ingrid) present. According to patient and family, patient has been having worsening pain, especially left lower back and pelvic pressure which causes left leg numbness. He was not able to walk properly for 3 days now.  Feels nausea and not able to eat well, constipated, groin pressure, difficulty urination. Percocet 5/325 does not improve his pain. plan to see urology (Dr. Jefferson) on 3/19 for tissue biopsy consultation.  3/25 he was admitted to hospital b/o rapidly worsening left flank, back pain and difficulty walking rapidly worsening left flank, back pain and difficulty walking  MRI spine showed cervical cord compression. Discussed to hold off surgery and follow with ortho closely. PSA elevated at 624. Urology consulted placed a stent in left ureter due to outside mass compression to the distal ureter, did not see any mass inside the left ureter. 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 Reports his left shoulder pain was resolved, left hip pain and lower back pain was improved. HIs dexamethasone was tapered off. He is on percocet 5-325 1tab every 88hours as needed and lidocaine topical film.   4/19/24: Patient has b/l leg pain. He says he has had this pain since diagnosis, but feels it has worsened in the last week. He feels the pain when he walks, it is a throbbing sensation in his knees and calves. Patient endorses ongoing numbness in toes, no change from prior, says massage helps. Patient reports muscle twitching in arms and legs, family member endorses that they can visibly see muscles tensing.   Patient reports pain in L upper back near scapula, says it radiates around his axilla to his chest. This pain has been there for the past week. Patient feels the pain at rest and it is worse when he moves. On a scale of 1-10, the pain is rated as a 5, when he takes percocet it  improves to a 4..   Patient endorses shortness of breath on exertion, says this developed in the last three weeks, can walk 50-60 feet before becoming short of breath. He endorses palpitations on exertion. He denies cough and shortness of breath at rest. He denies chest pain.   After chemotherapy patient felt more tired for about the first week, after that he got back to his normal routine but has been doing things a bit slower. Says appetite is okay. Has not had fever or chills. No nausea or vomiting. Is slightly constipated, will sometimes go two days without having a BM. Denies sores in mouth and changes in taste. Denies rashes, does have dry skin. Reports urinary urgency, denies hematuria and dysuria.    5/29/24 reports random chill, hot flash and sweating and fatigue, improving his pain in the left upper back, bilateral legs and left pelvis. ALso states nausea, denies constipation and diarrhea. Also reports skin itchiness in both lower legs in last 4 weeks.   7/1/24 reports feeling better after completing a total of 4 cycles of chemotherapy. He reports hot flashes and sweating, shortness of breath, minor memory loss, pain in the left shoulder blade when moving around. He eats better now.   7/22/24 today is feeling well, Endorses nausea, no vomiting but with occasional constipation. He also notes pimple like cyst around the anus likely external hemorrhoids. He will increase fiber and fluids intake. In the event he is experiencing discomfort then he can use OTC witch hezel to help ease pain. Tolerating Tencentriq/ LUPRON but endorses sweating and cold alternation but tolerable.   8/14/24 pt is doing well, not in acute distress. Dependent on assistive device to get around [cane]. Endorses improvement in anal discomfort since increasing fiber and fluids. Urine flow is strong, and empties bladder completely. Appetite is good and maintaining weight. He continues to take one tablet of oxycodone due to chronic left shoulder pain, and b/l Knee joint pain [provides adequate relieve]  8/30/24 reports left shoulder, left shoulder blade and new pain in the left lower rib cage pain. Had normal appetite and energy level.   9/25/24 Pt returns today with c/o chronic left shoulder pain during movement but not at rest and denies dyspnea.  Appetite is fair and has since lost ~ 7 pounds. Pt advised to eat small frequent meals. Abdominal is soft, ND, + BS. No diarrhea or constipation. Urinary flow is strong and empties bladder completely. denies hematuria or urgency. Nocturia 5 x day, pt to reduce fluid intake during evening hours. He also endorses difficulty completing ADLs task due to shoulder pain.   10/16/24 yesterday devloped a fever 102 relieved by tylenol. So far he is afebrile. Reports multiple joint pain including bilateral hip, elbow and right shoulder. Eats btter. Reports hot flash and no sweating. He is chair bound.   11/6/25 - abiraterone started late Sept 2024 + olaparib started mid Oct 2024. Notes fatigue, with 2-3 naps a day which is stable since before start of abiraterone and olaparib. Ongoing hot flashes. Dizziness and nausea with standing for too long. Needs assistance with ambulation, dressing, bathing. Can walk short distances with a 1 person assist, does not use a walker as it doesn't fit in his house. Appetite is stable. Mild SOB with exertion, resolves with rest. Urine flow and urgency are improved, 3-4x/night. Ongoing pain of left scapula and L posterior ribs, bilateral shoulders, bilateral knees, bilateral ankles, somewhat improved, max pain is 5/10. Taking PRN Percocet 5/325mg occasionally but not every day.   [de-identified] : prostate  [de-identified] : 11/26/24 - abiraterone started late Sept 2024 + olaparib started mid Oct 2024. Ongoing fatigue is somewhat worse, can walk a couple feet with a one person assist before needing to stop and rest. Occasional dizziness with standing. Appetite is improved. Coughing for the past week. Urine flow is "good", urgency at times, 3-4x/night. Notes increased pain in the joints (shoulders, elbows, hips, knees, ankles). Notes a shock like pain in the left upper back/scapula and rib/flank area. All pains are worse with movement and improves with lying down. Max pain is 7/10 with movement. Takes PRN Tylenol for mild pain and Percocet 5-325mg 1 tab per day with good effect.   12/23/24 - abiraterone started late Sept 2024 + olaparib started mid Oct 2024. Ongoing fatigue. Unsteady balance and dizziness at times. Fell on Saturday, he was climbing the second step of stairs and fell back landing on his right shoulder and right wrist. R wrist is swollen with decreased mobility, max pain is 9/10. Left upper back and shoulder pain are somewhat improved, max pain is 3/10. Taking PRN oxycodone 5mg up to 2-3x/day for R wrist pain, not for back pain. Urine flow is "good", notes urgency with occasional leakage., nocturia 4x/night. Denies fever, chills, night sweats, hot flashes, headache, chest pain, palpitations, SOB, cough, nausea/vomiting, diarrhea/constipation, abdominal pain, dysuria, hematuria, LE edema, rash/pruritus, bleeding  1/21/25 - abiraterone started late Sept 2024 + olaparib started mid Oct 2024. Some fatigue at times. Ongoing pains of L scapula and joints are much improved, max pain is 4/10. Using PRN oxycodone 5mg 1-2x/day with good effect. Notes tooth pain, was seen at the dental clinic and reportedly needs 12 extractions done, he is trying to have denture molds made prior to extractions. Intermittent hot flashes. Mild SOB with exertion, resolves with rest. Appetite is "good". Occasional nausea, PRN Zofran is helpful. L sided abdominal pain at times which he attributes to the ureteral stent which is due to be exchanged. Notes urine urgency, occasional hematuria, last episode was a couple weeks ago, some leakage at times, nocturia 4x/night. Denies fever, chills, night sweats, headache, dizziness, chest pain, palpitations, cough, vomiting, diarrhea/constipation, dysuria, incontinence, LE edema, rash/pruritus, bleeding.   2/28/25 reports moderate fatigue, sometimes loose stool 5 times a day, resolved its denies nausea, vomiting and constipation. Reports left ureteral stricture s/p stent placement 3 days ago with Dr. Benavides. His back pain is the same, and left hip pain is better, bilateral shoulder pain gets worse.   3/31/25: Still having pain in his back, left shoulder/upper arm, and his left hip. Right shoulder pain has resolved. Numbness worsening in his left leg over the past month. No fever, chills, unintentional weight loss. PSA has been steadily decreasing, checking again today. Genetic counseling scheduled for next week. Still looking for a way to get him dentures as he needs 12 teeth extracted before he gets Xgeva.

## 2025-04-01 NOTE — ASSESSMENT
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   In March 2024 he was admitted for worsening back pain and difficulty walking.   MRI spine showed cervical cord compression. Discussed to hold off surgery and follow with ortho closely. PSA elevated at 624. Urology consulted placed a stent in left ureter due to outside mass compression to the distal ureter, did not see any mass inside the left ureter. 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   On 4/1/24 pt was started on carbo/etoposide/tecentriq, plan for 4 cycles. Received first dose of lupron 4/3/24.  5/28/24 DEXA scan showed normal density.   9/10/24 CT CAP: Diffuse metastatic disease of the bones is markedly increased since prior exams. Previously noted enlarged hilar lymph nodes are now normal in size. Delayed left nephrogram with mild to moderate left hydroureter and hydronephrosis as well as some mucosal hyperemia (of infectious or inflammatory etiology). Previously present left perinephric stranding has markedly decreased.  9/10/24 NM Bone Scan: Compared to the bone scan in 3/2024, there is overall less intense and less heterogeneous foci of increased activity in the skeleton suggesting partial favorable response to therapy.   2/13/25 CT c/a/p showed Diffuse metastatic disease of the bones is more prominent since the prior exam No evidence of mediastinal lymphadenopathy. Previously noted slightly more prominent moderate left hydroureter and hydronephrosis with persistent delayed left nephrogram. Bone scan showed Again interval improvement in patchy uptake along the axial and appendicular skeleton. No new bony lesions.   Plan   Prostate neuroendocrine adenocarcinoma: - Continue Lupron [id number], last given 1/21/25, next due April 2025.  - Foundation tissue Dx: BRCA2 loss, BRIP *, myc amplification, Rb loss and p53 mutation - Continue abiraterone 1,000mg daily + prednisone 5mg daily and olaparib 300mg bid. Reviewed potential AEs not limited to hypertension, abnormal liver function, edema, arthralgia, diarrhea, skin rash, hyperglycemia, cytopenias, fatigue, nausea/vomiting, decreased appetite.  - PSA lennie rapidly from 57.3 on 9/3/24 to 159 on 9/25/24. Most recently PSA lennie to 359 on 11/6/24, down to 219 on 11/26/24, 151 on 12/23/24, 99.70 on 1/21/25. Repeat PSA today  - Clinically he's had a good response with decreased pain in the setting of a declining PSA and chromogranin A. - Repeat CT chest/abd/pelvis and bone scan in May 2025    Bone mets: - Dexa scan on 5/29/24 reports normal bone density. 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 Potential side effects reviewed including but not limited to hypocalcemia and ONJ. Instructed to obtain dental clearance prior to start of Xgeva, he reportedly needs 12 teeth extracted. Still need to find a way to get him dentures; he will advise us once complete.  Pain  - Cancer related pains of the left upper back and joints are improved, max pain is 4/10. Taking PRN oxycodone 5mg up to 2x/day with good effect. - Continue PRN Tylenol per package insert and PRN oxycodone 5mg q8hrs for moderate to severe pain. Bowel regimen in place.   Instructed to contact our office with any new/worsening symptoms. Pt and family educated regarding plan of care, all questions/concerns addressed to the best of my abilities and their apparent satisfaction.  F/u in 6 wks  Patient seen and examined with Dr. Vergara.   Storm Durant M.D. Hematology/Oncology Fellow PGY-6 [facility]

## 2025-04-01 NOTE — ASSESSMENT
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   In March 2024 he was admitted for worsening back pain and difficulty walking.   MRI spine showed cervical cord compression. Discussed to hold off surgery and follow with ortho closely. PSA elevated at 624. Urology consulted placed a stent in left ureter due to outside mass compression to the distal ureter, did not see any mass inside the left ureter. 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   On 4/1/24 pt was started on carbo/etoposide/tecentriq, plan for 4 cycles. Received first dose of lupron 4/3/24.  5/28/24 DEXA scan showed normal density.   9/10/24 CT CAP: Diffuse metastatic disease of the bones is markedly increased since prior exams. Previously noted enlarged hilar lymph nodes are now normal in size. Delayed left nephrogram with mild to moderate left hydroureter and hydronephrosis as well as some mucosal hyperemia (of infectious or inflammatory etiology). Previously present left perinephric stranding has markedly decreased.  9/10/24 NM Bone Scan: Compared to the bone scan in 3/2024, there is overall less intense and less heterogeneous foci of increased activity in the skeleton suggesting partial favorable response to therapy.   2/13/25 CT c/a/p showed Diffuse metastatic disease of the bones is more prominent since the prior exam No evidence of mediastinal lymphadenopathy. Previously noted slightly more prominent moderate left hydroureter and hydronephrosis with persistent delayed left nephrogram. Bone scan showed Again interval improvement in patchy uptake along the axial and appendicular skeleton. No new bony lesions.   Plan   Prostate neuroendocrine adenocarcinoma: - Continue Lupron [id number], last given 1/21/25, next due April 2025.  - Foundation tissue Dx: BRCA2 loss, BRIP *, myc amplification, Rb loss and p53 mutation - Continue abiraterone 1,000mg daily + prednisone 5mg daily and olaparib 300mg bid. Reviewed potential AEs not limited to hypertension, abnormal liver function, edema, arthralgia, diarrhea, skin rash, hyperglycemia, cytopenias, fatigue, nausea/vomiting, decreased appetite.  - PSA lennie rapidly from 57.3 on 9/3/24 to 159 on 9/25/24. Most recently PSA lennie to 359 on 11/6/24, down to 219 on 11/26/24, 151 on 12/23/24, 99.70 on 1/21/25. Repeat PSA today  - Clinically he's had a good response with decreased pain in the setting of a declining PSA and chromogranin A. - Repeat CT chest/abd/pelvis and bone scan in May 2025    Bone mets: - Dexa scan on 5/29/24 reports normal bone density. 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 Potential side effects reviewed including but not limited to hypocalcemia and ONJ. Instructed to obtain dental clearance prior to start of Xgeva, he reportedly needs 12 teeth extracted. Still need to find a way to get him dentures; he will advise us once complete.  Pain  - Cancer related pains of the left upper back and joints are improved, max pain is 4/10. Taking PRN oxycodone 5mg up to 2x/day with good effect. - Continue PRN Tylenol per package insert and PRN oxycodone 5mg q8hrs for moderate to severe pain. Bowel regimen in place.   Instructed to contact our office with any new/worsening symptoms. Pt and family educated regarding plan of care, all questions/concerns addressed to the best of my abilities and their apparent satisfaction.  F/u in 6 wks  Patient seen and examined with Dr. Vergara.   Storm Durant M.D. Hematology/Oncology Fellow PGY-6 [facility]

## 2025-04-29 NOTE — ASSESSMENT
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   In March 2024 he was admitted for worsening back pain and difficulty walking.   MRI spine showed cervical cord compression. Discussed to hold off surgery and follow with ortho closely. PSA elevated at 624. Urology consulted placed a stent in left ureter due to outside mass compression to the distal ureter, did not see any mass inside the left ureter. 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   On 4/1/24 pt was started on carbo/etoposide/tecentriq, plan for 4 cycles. Received first dose of lupron 4/3/24.  5/28/24 DEXA scan showed normal density.   9/10/24 CT CAP: Diffuse metastatic disease of the bones is markedly increased since prior exams. Previously noted enlarged hilar lymph nodes are now normal in size. Delayed left nephrogram with mild to moderate left hydroureter and hydronephrosis as well as some mucosal hyperemia (of infectious or inflammatory etiology). Previously present left perinephric stranding has markedly decreased.  9/10/24 NM Bone Scan: Compared to the bone scan in 3/2024, there is overall less intense and less heterogeneous foci of increased activity in the skeleton suggesting partial favorable response to therapy.   2/13/25 CT c/a/p showed Diffuse metastatic disease of the bones is more prominent since the prior exam No evidence of mediastinal lymphadenopathy. Previously noted slightly more prominent moderate left hydroureter and hydronephrosis with persistent delayed left nephrogram. Bone scan showed Again interval improvement in patchy uptake along the axial and appendicular skeleton. No new bony lesions.   Plan   Prostate neuroendocrine adenocarcinoma: - Continue Lupron [id number], last given 1/21/25, next due April 2025.  - Foundation tissue Dx: BRCA2 loss, BRIP *, myc amplification, Rb loss and p53 mutation - Continue abiraterone 1,000mg daily + prednisone 5mg daily and olaparib 300mg bid. Reviewed potential AEs not limited to hypertension, abnormal liver function, edema, arthralgia, diarrhea, skin rash, hyperglycemia, cytopenias, fatigue, nausea/vomiting, decreased appetite.  - PSA lennie rapidly from 57.3 on 9/3/24 to 159 on 9/25/24. Most recently PSA lennie to 359 on 11/6/24, down to 219 on 11/26/24, 151 on 12/23/24, 99.70 on 1/21/25. Repeat PSA today  - Clinically he's had a good response with decreased pain in the setting of a declining PSA and chromogranin A. - Repeat CT chest/abd/pelvis and bone scan in May 2025    Bone mets: - Dexa scan on 5/29/24 reports normal bone density. 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 Potential side effects reviewed including but not limited to hypocalcemia and ONJ. Instructed to obtain dental clearance prior to start of Xgeva, he reportedly needs 12 teeth extracted. Still need to find a way to get him dentures; he will advise us once complete.  Pain  - Cancer related pains of the left upper back and joints are improved, max pain is 4/10. Taking PRN oxycodone 5mg up to 2x/day with good effect. - Continue PRN Tylenol per package insert and PRN oxycodone 5mg q8hrs for moderate to severe pain. Bowel regimen in place.   Instructed to contact our office with any new/worsening symptoms. Pt and family educated regarding plan of care, all questions/concerns addressed to the best of my abilities and their apparent satisfaction.  F/u in 6 wks

## 2025-04-29 NOTE — PHYSICAL EXAM
[Ambulatory and capable of all self care but unable to carry out any work activities] : Status 2- Ambulatory and capable of all self care but unable to carry out any work activities. Up and about more than 50% of waking hours [Normal] : affect appropriate [de-identified] : anicteric  [de-identified] : no LE edema

## 2025-04-29 NOTE — HISTORY OF PRESENT ILLNESS
[de-identified] : Mr. Marti is a 64 yo M with PMHx of HTN, HLD, DM on metformin, chronic left shoulder/knee/hip joint pain; he had the initial medical oncology consultation for finding of left kidney on 3/1/24:  He initially started having inermittent left flank pain rated as 7/10 lasting several hours. He went to [facility] and admitted for 2 days (AMA on Feb 10th 2024 ); he had CT abd/p done there. He was told that he needs nephrectomy but he denied since "his mother had similar situation; she had nephrectomy but pathology was benign". He went to [facility] ER on 2/11/24 and had CT abd/p done. He was discharged from ER on the same day. He reports intermittent generalized fatigue, poor appetite for several months, and lost 30 lb in 8 months. He admits intermittent bone/muscle pain for several months. Denied hematuria, urine difficulty, fever, SOB at rest. He is able to walk 1-2 blocks but has   SOB on exertion.    -2/9/24 CT abd/p with IV contrast showing moderate left hydroureteronephrosis extending to the left distal ureter with a previous left ureteral mass. Findings suggestive of rupture of the left renal pelvis with prominent edema and fluid around the left kidney as well as delayed left nephrogram. Multiple enlarged retroperitoneal lymph nodes suspicious for metastases. Multiple scattered lytic and osseous sclerotic lesions concerning for metastatic disease.   -2/9/24 wbc 5.4 Hb 12.1 MCV 78.6% plt 266; K5.3 Cr 0.9 BUN 19 ; Ca 8.5 ALT 79; AST 67  albumin 3.3 Glucose 463.  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 Associated upstream mild hydroureteronephrosis. Extensive perinephric/periureteral stranding and fluid may reflect associated renal pelvic calyceal rupture as reported. Nonspecific renal pelvis and ureteral enhancement as well as bladder wall thickening. Recommend correlation with urinalysis for superimposed infection. Evidence of cheryl and osseous metastatic disease.  3/1/2024 patient had initial consultation and recommended for tissue biopsy, referred to urology. Percocet was prescribed for back pain.  3/1/24 CTA showed no pulmonary embolus, nonspecific bilateral hilar lymph nodes. diffuse skeletal metastases. 3/8/2024 Pt had Bone scan which shows widespread extensive skeletal metastasis.    Medication glipiz//metformin 5-500mg two tablets  bid  losartan one tablet once daily  jardiance 10mg daily   PSHx appendectomy >30-40 yrs ago SHx Denies ETOH/smoking. 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  FHx Denies family history of cancers/malignancies.  Allergy NKDA   3/8 Patient p/w an urgent visit. Son (Conner) and daughter(Ingrid) present. According to patient and family, patient has been having worsening pain, especially left lower back and pelvic pressure which causes left leg numbness. He was not able to walk properly for 3 days now.  Feels nausea and not able to eat well, constipated, groin pressure, difficulty urination. Percocet 5/325 does not improve his pain. plan to see urology (Dr. Jefferson) on 3/19 for tissue biopsy consultation.  3/25 he was admitted to hospital b/o rapidly worsening left flank, back pain and difficulty walking rapidly worsening left flank, back pain and difficulty walking  MRI spine showed cervical cord compression. Discussed to hold off surgery and follow with ortho closely. PSA elevated at 624. Urology consulted placed a stent in left ureter due to outside mass compression to the distal ureter, did not see any mass inside the left ureter. 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 Reports his left shoulder pain was resolved, left hip pain and lower back pain was improved. HIs dexamethasone was tapered off. He is on percocet 5-325 1tab every 88hours as needed and lidocaine topical film.   4/19/24: Patient has b/l leg pain. He says he has had this pain since diagnosis, but feels it has worsened in the last week. He feels the pain when he walks, it is a throbbing sensation in his knees and calves. Patient endorses ongoing numbness in toes, no change from prior, says massage helps. Patient reports muscle twitching in arms and legs, family member endorses that they can visibly see muscles tensing.   Patient reports pain in L upper back near scapula, says it radiates around his axilla to his chest. This pain has been there for the past week. Patient feels the pain at rest and it is worse when he moves. On a scale of 1-10, the pain is rated as a 5, when he takes percocet it  improves to a 4..   Patient endorses shortness of breath on exertion, says this developed in the last three weeks, can walk 50-60 feet before becoming short of breath. He endorses palpitations on exertion. He denies cough and shortness of breath at rest. He denies chest pain.   After chemotherapy patient felt more tired for about the first week, after that he got back to his normal routine but has been doing things a bit slower. Says appetite is okay. Has not had fever or chills. No nausea or vomiting. Is slightly constipated, will sometimes go two days without having a BM. Denies sores in mouth and changes in taste. Denies rashes, does have dry skin. Reports urinary urgency, denies hematuria and dysuria.    5/29/24 reports random chill, hot flash and sweating and fatigue, improving his pain in the left upper back, bilateral legs and left pelvis. ALso states nausea, denies constipation and diarrhea. Also reports skin itchiness in both lower legs in last 4 weeks.   7/1/24 reports feeling better after completing a total of 4 cycles of chemotherapy. He reports hot flashes and sweating, shortness of breath, minor memory loss, pain in the left shoulder blade when moving around. He eats better now.   7/22/24 today is feeling well, Endorses nausea, no vomiting but with occasional constipation. He also notes pimple like cyst around the anus likely external hemorrhoids. He will increase fiber and fluids intake. In the event he is experiencing discomfort then he can use OTC witch hezel to help ease pain. Tolerating Tencentriq/ LUPRON but endorses sweating and cold alternation but tolerable.   8/14/24 pt is doing well, not in acute distress. Dependent on assistive device to get around [cane]. Endorses improvement in anal discomfort since increasing fiber and fluids. Urine flow is strong, and empties bladder completely. Appetite is good and maintaining weight. He continues to take one tablet of oxycodone due to chronic left shoulder pain, and b/l Knee joint pain [provides adequate relieve]  8/30/24 reports left shoulder, left shoulder blade and new pain in the left lower rib cage pain. Had normal appetite and energy level.   9/25/24 Pt returns today with c/o chronic left shoulder pain during movement but not at rest and denies dyspnea.  Appetite is fair and has since lost ~ 7 pounds. Pt advised to eat small frequent meals. Abdominal is soft, ND, + BS. No diarrhea or constipation. Urinary flow is strong and empties bladder completely. denies hematuria or urgency. Nocturia 5 x day, pt to reduce fluid intake during evening hours. He also endorses difficulty completing ADLs task due to shoulder pain.   10/16/24 yesterday devloped a fever 102 relieved by tylenol. So far he is afebrile. Reports multiple joint pain including bilateral hip, elbow and right shoulder. Eats btter. Reports hot flash and no sweating. He is chair bound.   11/6/25 - abiraterone started late Sept 2024 + olaparib started mid Oct 2024. Notes fatigue, with 2-3 naps a day which is stable since before start of abiraterone and olaparib. Ongoing hot flashes. Dizziness and nausea with standing for too long. Needs assistance with ambulation, dressing, bathing. Can walk short distances with a 1 person assist, does not use a walker as it doesn't fit in his house. Appetite is stable. Mild SOB with exertion, resolves with rest. Urine flow and urgency are improved, 3-4x/night. Ongoing pain of left scapula and L posterior ribs, bilateral shoulders, bilateral knees, bilateral ankles, somewhat improved, max pain is 5/10. Taking PRN Percocet 5/325mg occasionally but not every day.   [de-identified] : prostate  [de-identified] : 11/26/24 - abiraterone started late Sept 2024 + olaparib started mid Oct 2024. Ongoing fatigue is somewhat worse, can walk a couple feet with a one person assist before needing to stop and rest. Occasional dizziness with standing. Appetite is improved. Coughing for the past week. Urine flow is "good", urgency at times, 3-4x/night. Notes increased pain in the joints (shoulders, elbows, hips, knees, ankles). Notes a shock like pain in the left upper back/scapula and rib/flank area. All pains are worse with movement and improves with lying down. Max pain is 7/10 with movement. Takes PRN Tylenol for mild pain and Percocet 5-325mg 1 tab per day with good effect.   12/23/24 - abiraterone started late Sept 2024 + olaparib started mid Oct 2024. Ongoing fatigue. Unsteady balance and dizziness at times. Fell on Saturday, he was climbing the second step of stairs and fell back landing on his right shoulder and right wrist. R wrist is swollen with decreased mobility, max pain is 9/10. Left upper back and shoulder pain are somewhat improved, max pain is 3/10. Taking PRN oxycodone 5mg up to 2-3x/day for R wrist pain, not for back pain. Urine flow is "good", notes urgency with occasional leakage., nocturia 4x/night. Denies fever, chills, night sweats, hot flashes, headache, chest pain, palpitations, SOB, cough, nausea/vomiting, diarrhea/constipation, abdominal pain, dysuria, hematuria, LE edema, rash/pruritus, bleeding  1/21/25 - abiraterone started late Sept 2024 + olaparib started mid Oct 2024. Some fatigue at times. Ongoing pains of L scapula and joints are much improved, max pain is 4/10. Using PRN oxycodone 5mg 1-2x/day with good effect. Notes tooth pain, was seen at the dental clinic and reportedly needs 12 extractions done, he is trying to have denture molds made prior to extractions. Intermittent hot flashes. Mild SOB with exertion, resolves with rest. Appetite is "good". Occasional nausea, PRN Zofran is helpful. L sided abdominal pain at times which he attributes to the ureteral stent which is due to be exchanged. Notes urine urgency, occasional hematuria, last episode was a couple weeks ago, some leakage at times, nocturia 4x/night. Denies fever, chills, night sweats, headache, dizziness, chest pain, palpitations, cough, vomiting, diarrhea/constipation, dysuria, incontinence, LE edema, rash/pruritus, bleeding.   2/28/25 reports moderate fatigue, sometimes loose stool 5 times a day, resolved its denies nausea, vomiting and constipation. Reports left ureteral stricture s/p stent placement 3 days ago with Dr. Benavides. His back pain is the same, and left hip pain is better, bilateral shoulder pain gets worse.   3/31/25: Still having pain in his back, left shoulder/upper arm, and his left hip. Right shoulder pain has resolved. Numbness worsening in his left leg over the past month. No fever, chills, unintentional weight loss. PSA has been steadily decreasing, checking again today. Genetic counseling scheduled for next week. Still looking for a way to get him dentures as he needs 12 teeth extracted before he gets Xgeva.  4/29/25 reports improving pain in his back, left shoulder, upper arm and left hip. Endorses unchanged numbness in his left leg.

## 2025-05-21 NOTE — REVIEW OF SYSTEMS
[Fatigue] : fatigue [Abdominal Pain] : abdominal pain [Diarrhea: Grade 0] : Diarrhea: Grade 0 [Incontinence] : incontinence [Joint Pain] : joint pain [Joint Stiffness] : joint stiffness [Muscle Pain] : muscle pain [Muscle Weakness] : muscle weakness [Dizziness] : dizziness [Hot Flashes] : hot flashes [Fever] : no fever [Chills] : no chills [Night Sweats] : no night sweats [Chest Pain] : no chest pain [Palpitations] : no palpitations [Lower Ext Edema] : no lower extremity edema [Shortness Of Breath] : no shortness of breath [Cough] : no cough [Vomiting] : no vomiting [Constipation] : no constipation [Dysuria] : no dysuria [Easy Bleeding] : no tendency for easy bleeding [Easy Bruising] : no tendency for easy bruising

## 2025-05-21 NOTE — ASSESSMENT
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   In March 2024 he was admitted for worsening back pain and difficulty walking.   MRI spine showed cervical cord compression. Discussed to hold off surgery and follow with ortho closely. PSA elevated at 624. Urology consulted placed a stent in left ureter due to outside mass compression to the distal ureter, did not see any mass inside the left ureter. 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   On 4/1/24 pt was started on carbo/etoposide/tecentriq, plan for 4 cycles. Received first dose of lupron 4/3/24.  5/28/24 DEXA scan showed normal density.   9/10/24 CT CAP: Diffuse metastatic disease of the bones is markedly increased since prior exams. Previously noted enlarged hilar lymph nodes are now normal in size. Delayed left nephrogram with mild to moderate left hydroureter and hydronephrosis as well as some mucosal hyperemia (of infectious or inflammatory etiology). Previously present left perinephric stranding has markedly decreased.  9/10/24 NM Bone Scan: Compared to the bone scan in 3/2024, there is overall less intense and less heterogeneous foci of increased activity in the skeleton suggesting partial favorable response to therapy.   2/13/25 CT c/a/p showed Diffuse metastatic disease of the bones is more prominent since the prior exam No evidence of mediastinal lymphadenopathy. Previously noted slightly more prominent moderate left hydroureter and hydronephrosis with persistent delayed left nephrogram. Bone scan showed Again interval improvement in patchy uptake along the axial and appendicular skeleton. No new bony lesions.   Plan   Prostate neuroendocrine adenocarcinoma: - Continue Lupron [id number], last given 4/29/25, next due July 2025 - Foundation tissue Dx: BRCA2 loss, BRIP *, myc amplification, Rb loss and p53 mutation - Continue abiraterone 1,000mg daily + prednisone daily (started Sept 2024) and olaparib 300mg bid (started Oct 2024). Reviewed potential AEs not limited to hypertension, abnormal liver function, edema, arthralgia, diarrhea, skin rash, hyperglycemia, cytopenias, fatigue, nausea/vomiting, decreased appetite.  - Will reduce prednisone to 2.5mg daily given poorly controlled glucoses in the 250-300 range.  - PSA was 359 on 11/6/24 at start of treatment. PSA eli was 87 on 3/31/25. PSA was 105 on 4/29/25. Repeat PSA today is pending.  - Clinically he's had a good response with decreased pain in the setting of a declining PSA and chromogranin A. - Repeat CT chest/abd/pelvis and bone scan due now, ordered today 5/21/25.   Bone mets: - Dexa scan on 5/29/24 reports normal bone density. 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 Potential side effects reviewed including but not limited to hypocalcemia and ONJ. Instructed to obtain dental clearance prior to start of Xgeva, he reportedly needs 12 teeth extracted. Still need to find a way to get him dentures; he will advise us once complete.  Pain  - Cancer related pains of the left upper back and joints are improved, max pain is 5/10. Taking PRN oxycodone 5mg up to 2x/day with good effect. - Continue PRN Tylenol per package insert and PRN oxycodone 5mg q8hrs for moderate to severe pain. Bowel regimen in place.   Instructed to contact our office with any new/worsening symptoms. Pt and family educated regarding plan of care, all questions/concerns addressed to the best of my abilities and their apparent satisfaction. RTC 4wks

## 2025-05-21 NOTE — HISTORY OF PRESENT ILLNESS
[de-identified] : Mr. Marti is a 62 yo M with PMHx of HTN, HLD, DM on metformin, chronic left shoulder/knee/hip joint pain; he had the initial medical oncology consultation for finding of left kidney on 3/1/24:  He initially started having inermittent left flank pain rated as 7/10 lasting several hours. He went to [facility] and admitted for 2 days (AMA on Feb 10th 2024 ); he had CT abd/p done there. He was told that he needs nephrectomy but he denied since "his mother had similar situation; she had nephrectomy but pathology was benign". He went to [facility] ER on 2/11/24 and had CT abd/p done. He was discharged from ER on the same day. He reports intermittent generalized fatigue, poor appetite for several months, and lost 30 lb in 8 months. He admits intermittent bone/muscle pain for several months. Denied hematuria, urine difficulty, fever, SOB at rest. He is able to walk 1-2 blocks but has   SOB on exertion.    -2/9/24 CT abd/p with IV contrast showing moderate left hydroureteronephrosis extending to the left distal ureter with a previous left ureteral mass. Findings suggestive of rupture of the left renal pelvis with prominent edema and fluid around the left kidney as well as delayed left nephrogram. Multiple enlarged retroperitoneal lymph nodes suspicious for metastases. Multiple scattered lytic and osseous sclerotic lesions concerning for metastatic disease.   -2/9/24 wbc 5.4 Hb 12.1 MCV 78.6% plt 266; K5.3 Cr 0.9 BUN 19 ; Ca 8.5 ALT 79; AST 67  albumin 3.3 Glucose 463.  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 Associated upstream mild hydroureteronephrosis. Extensive perinephric/periureteral stranding and fluid may reflect associated renal pelvic calyceal rupture as reported. Nonspecific renal pelvis and ureteral enhancement as well as bladder wall thickening. Recommend correlation with urinalysis for superimposed infection. Evidence of cheryl and osseous metastatic disease.  3/1/2024 patient had initial consultation and recommended for tissue biopsy, referred to urology. Percocet was prescribed for back pain.  3/1/24 CTA showed no pulmonary embolus, nonspecific bilateral hilar lymph nodes. diffuse skeletal metastases. 3/8/2024 Pt had Bone scan which shows widespread extensive skeletal metastasis.    Medication glipiz//metformin 5-500mg two tablets  bid  losartan one tablet once daily  jardiance 10mg daily   PSHx appendectomy >30-40 yrs ago SHx Denies ETOH/smoking. 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  FHx Denies family history of cancers/malignancies.  Allergy NKDA   3/8 Patient p/w an urgent visit. Son (Conner) and daughter(Ingrid) present. According to patient and family, patient has been having worsening pain, especially left lower back and pelvic pressure which causes left leg numbness. He was not able to walk properly for 3 days now.  Feels nausea and not able to eat well, constipated, groin pressure, difficulty urination. Percocet 5/325 does not improve his pain. plan to see urology (Dr. Jefferson) on 3/19 for tissue biopsy consultation.  3/25 he was admitted to hospital b/o rapidly worsening left flank, back pain and difficulty walking rapidly worsening left flank, back pain and difficulty walking  MRI spine showed cervical cord compression. Discussed to hold off surgery and follow with ortho closely. PSA elevated at 624. Urology consulted placed a stent in left ureter due to outside mass compression to the distal ureter, did not see any mass inside the left ureter. 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 Reports his left shoulder pain was resolved, left hip pain and lower back pain was improved. HIs dexamethasone was tapered off. He is on percocet 5-325 1tab every 88hours as needed and lidocaine topical film.   4/19/24: Patient has b/l leg pain. He says he has had this pain since diagnosis, but feels it has worsened in the last week. He feels the pain when he walks, it is a throbbing sensation in his knees and calves. Patient endorses ongoing numbness in toes, no change from prior, says massage helps. Patient reports muscle twitching in arms and legs, family member endorses that they can visibly see muscles tensing.   Patient reports pain in L upper back near scapula, says it radiates around his axilla to his chest. This pain has been there for the past week. Patient feels the pain at rest and it is worse when he moves. On a scale of 1-10, the pain is rated as a 5, when he takes percocet it  improves to a 4..   Patient endorses shortness of breath on exertion, says this developed in the last three weeks, can walk 50-60 feet before becoming short of breath. He endorses palpitations on exertion. He denies cough and shortness of breath at rest. He denies chest pain.   After chemotherapy patient felt more tired for about the first week, after that he got back to his normal routine but has been doing things a bit slower. Says appetite is okay. Has not had fever or chills. No nausea or vomiting. Is slightly constipated, will sometimes go two days without having a BM. Denies sores in mouth and changes in taste. Denies rashes, does have dry skin. Reports urinary urgency, denies hematuria and dysuria.    5/29/24 reports random chill, hot flash and sweating and fatigue, improving his pain in the left upper back, bilateral legs and left pelvis. ALso states nausea, denies constipation and diarrhea. Also reports skin itchiness in both lower legs in last 4 weeks.   7/1/24 reports feeling better after completing a total of 4 cycles of chemotherapy. He reports hot flashes and sweating, shortness of breath, minor memory loss, pain in the left shoulder blade when moving around. He eats better now.   7/22/24 today is feeling well, Endorses nausea, no vomiting but with occasional constipation. He also notes pimple like cyst around the anus likely external hemorrhoids. He will increase fiber and fluids intake. In the event he is experiencing discomfort then he can use OTC witch hezel to help ease pain. Tolerating Tencentriq/ LUPRON but endorses sweating and cold alternation but tolerable.   8/14/24 pt is doing well, not in acute distress. Dependent on assistive device to get around [cane]. Endorses improvement in anal discomfort since increasing fiber and fluids. Urine flow is strong, and empties bladder completely. Appetite is good and maintaining weight. He continues to take one tablet of oxycodone due to chronic left shoulder pain, and b/l Knee joint pain [provides adequate relieve]  8/30/24 reports left shoulder, left shoulder blade and new pain in the left lower rib cage pain. Had normal appetite and energy level.   9/25/24 Pt returns today with c/o chronic left shoulder pain during movement but not at rest and denies dyspnea.  Appetite is fair and has since lost ~ 7 pounds. Pt advised to eat small frequent meals. Abdominal is soft, ND, + BS. No diarrhea or constipation. Urinary flow is strong and empties bladder completely. denies hematuria or urgency. Nocturia 5 x day, pt to reduce fluid intake during evening hours. He also endorses difficulty completing ADLs task due to shoulder pain.   10/16/24 yesterday devloped a fever 102 relieved by tylenol. So far he is afebrile. Reports multiple joint pain including bilateral hip, elbow and right shoulder. Eats btter. Reports hot flash and no sweating. He is chair bound.   11/6/25 - abiraterone started late Sept 2024 + olaparib started mid Oct 2024. Notes fatigue, with 2-3 naps a day which is stable since before start of abiraterone and olaparib. Ongoing hot flashes. Dizziness and nausea with standing for too long. Needs assistance with ambulation, dressing, bathing. Can walk short distances with a 1 person assist, does not use a walker as it doesn't fit in his house. Appetite is stable. Mild SOB with exertion, resolves with rest. Urine flow and urgency are improved, 3-4x/night. Ongoing pain of left scapula and L posterior ribs, bilateral shoulders, bilateral knees, bilateral ankles, somewhat improved, max pain is 5/10. Taking PRN Percocet 5/325mg occasionally but not every day.   [de-identified] : prostate  [de-identified] : 11/26/24 - abiraterone started late Sept 2024 + olaparib started mid Oct 2024. Ongoing fatigue is somewhat worse, can walk a couple feet with a one person assist before needing to stop and rest. Occasional dizziness with standing. Appetite is improved. Coughing for the past week. Urine flow is "good", urgency at times, 3-4x/night. Notes increased pain in the joints (shoulders, elbows, hips, knees, ankles). Notes a shock like pain in the left upper back/scapula and rib/flank area. All pains are worse with movement and improves with lying down. Max pain is 7/10 with movement. Takes PRN Tylenol for mild pain and Percocet 5-325mg 1 tab per day with good effect.   12/23/24 - abiraterone started late Sept 2024 + olaparib started mid Oct 2024. Ongoing fatigue. Unsteady balance and dizziness at times. Fell on Saturday, he was climbing the second step of stairs and fell back landing on his right shoulder and right wrist. R wrist is swollen with decreased mobility, max pain is 9/10. Left upper back and shoulder pain are somewhat improved, max pain is 3/10. Taking PRN oxycodone 5mg up to 2-3x/day for R wrist pain, not for back pain. Urine flow is "good", notes urgency with occasional leakage., nocturia 4x/night. Denies fever, chills, night sweats, hot flashes, headache, chest pain, palpitations, SOB, cough, nausea/vomiting, diarrhea/constipation, abdominal pain, dysuria, hematuria, LE edema, rash/pruritus, bleeding  1/21/25 - abiraterone started late Sept 2024 + olaparib started mid Oct 2024. Some fatigue at times. Ongoing pains of L scapula and joints are much improved, max pain is 4/10. Using PRN oxycodone 5mg 1-2x/day with good effect. Notes tooth pain, was seen at the dental clinic and reportedly needs 12 extractions done, he is trying to have denture molds made prior to extractions. Intermittent hot flashes. Mild SOB with exertion, resolves with rest. Appetite is "good". Occasional nausea, PRN Zofran is helpful. L sided abdominal pain at times which he attributes to the ureteral stent which is due to be exchanged. Notes urine urgency, occasional hematuria, last episode was a couple weeks ago, some leakage at times, nocturia 4x/night. Denies fever, chills, night sweats, headache, dizziness, chest pain, palpitations, cough, vomiting, diarrhea/constipation, dysuria, incontinence, LE edema, rash/pruritus, bleeding.   2/28/25 reports moderate fatigue, sometimes loose stool 5 times a day, resolved its denies nausea, vomiting and constipation. Reports left ureteral stricture s/p stent placement 3 days ago with Dr. Benavides. His back pain is the same, and left hip pain is better, bilateral shoulder pain gets worse.   3/31/25: Still having pain in his back, left shoulder/upper arm, and his left hip. Right shoulder pain has resolved. Numbness worsening in his left leg over the past month. No fever, chills, unintentional weight loss. PSA has been steadily decreasing, checking again today. Genetic counseling scheduled for next week. Still looking for a way to get him dentures as he needs 12 teeth extracted before he gets Xgeva.  4/29/25 reports improving pain in his back, left shoulder, upper arm and left hip. Endorses unchanged numbness in his left leg.  5/21/25 - ongoing fatigue is stable. Hot flashes at times. Occasional dizziness only with changing positions quickly. Appetite is good. Glucoses over the past month have been poorly controlled. averaging in 250-300's. Occasional nausea, especially after taking abiraterone. Urine flow varies but stable, incontinence at times, nocturia 4x/night, no dysuria/hematuria. Ongoing L upper back and shoulder pains are stable, max pain 5/10, takes PRN oxycodone 5mg at bedtime with adequate relief. Notes intermittent L abdomen pain/pinching sensation which he believes is r/t ureteral stent, due for exchange next month.

## 2025-05-21 NOTE — REASON FOR VISIT
[Follow-Up Visit] : a follow-up [Family Member] : family member [FreeTextEntry2] : metastatic castrate resistant prostate cancer

## 2025-05-21 NOTE — PHYSICAL EXAM
[Ambulatory and capable of all self care but unable to carry out any work activities] : Status 2- Ambulatory and capable of all self care but unable to carry out any work activities. Up and about more than 50% of waking hours [Normal] : affect appropriate [de-identified] : anicteric  [de-identified] : no LE edema  [de-identified] : mild discomfort with palpation of lumbar spine

## 2025-05-21 NOTE — ASSESSMENT
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   In March 2024 he was admitted for worsening back pain and difficulty walking.   MRI spine showed cervical cord compression. Discussed to hold off surgery and follow with ortho closely. PSA elevated at 624. Urology consulted placed a stent in left ureter due to outside mass compression to the distal ureter, did not see any mass inside the left ureter. 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   On 4/1/24 pt was started on carbo/etoposide/tecentriq, plan for 4 cycles. Received first dose of lupron 4/3/24.  5/28/24 DEXA scan showed normal density.   9/10/24 CT CAP: Diffuse metastatic disease of the bones is markedly increased since prior exams. Previously noted enlarged hilar lymph nodes are now normal in size. Delayed left nephrogram with mild to moderate left hydroureter and hydronephrosis as well as some mucosal hyperemia (of infectious or inflammatory etiology). Previously present left perinephric stranding has markedly decreased.  9/10/24 NM Bone Scan: Compared to the bone scan in 3/2024, there is overall less intense and less heterogeneous foci of increased activity in the skeleton suggesting partial favorable response to therapy.   2/13/25 CT c/a/p showed Diffuse metastatic disease of the bones is more prominent since the prior exam No evidence of mediastinal lymphadenopathy. Previously noted slightly more prominent moderate left hydroureter and hydronephrosis with persistent delayed left nephrogram. Bone scan showed Again interval improvement in patchy uptake along the axial and appendicular skeleton. No new bony lesions.   Plan   Prostate neuroendocrine adenocarcinoma: - Continue Lupron [id number], last given 4/29/25, next due July 2025 - Foundation tissue Dx: BRCA2 loss, BRIP *, myc amplification, Rb loss and p53 mutation - Continue abiraterone 1,000mg daily + prednisone daily (started Sept 2024) and olaparib 300mg bid (started Oct 2024). Reviewed potential AEs not limited to hypertension, abnormal liver function, edema, arthralgia, diarrhea, skin rash, hyperglycemia, cytopenias, fatigue, nausea/vomiting, decreased appetite.  - Will reduce prednisone to 2.5mg daily given poorly controlled glucoses in the 250-300 range.  - PSA was 359 on 11/6/24 at start of treatment. PSA eli was 87 on 3/31/25. PSA was 105 on 4/29/25. Repeat PSA today is pending.  - Clinically he's had a good response with decreased pain in the setting of a declining PSA and chromogranin A. - Repeat CT chest/abd/pelvis and bone scan due now, ordered today 5/21/25.   Bone mets: - Dexa scan on 5/29/24 reports normal bone density. 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 Potential side effects reviewed including but not limited to hypocalcemia and ONJ. Instructed to obtain dental clearance prior to start of Xgeva, he reportedly needs 12 teeth extracted. Still need to find a way to get him dentures; he will advise us once complete.  Pain  - Cancer related pains of the left upper back and joints are improved, max pain is 5/10. Taking PRN oxycodone 5mg up to 2x/day with good effect. - Continue PRN Tylenol per package insert and PRN oxycodone 5mg q8hrs for moderate to severe pain. Bowel regimen in place.   Instructed to contact our office with any new/worsening symptoms. Pt and family educated regarding plan of care, all questions/concerns addressed to the best of my abilities and their apparent satisfaction. RTC 4wks

## 2025-05-21 NOTE — PHYSICAL EXAM
[Ambulatory and capable of all self care but unable to carry out any work activities] : Status 2- Ambulatory and capable of all self care but unable to carry out any work activities. Up and about more than 50% of waking hours [Normal] : affect appropriate [de-identified] : anicteric  [de-identified] : no LE edema  [de-identified] : mild discomfort with palpation of lumbar spine

## 2025-05-21 NOTE — HISTORY OF PRESENT ILLNESS
[de-identified] : Mr. Marti is a 62 yo M with PMHx of HTN, HLD, DM on metformin, chronic left shoulder/knee/hip joint pain; he had the initial medical oncology consultation for finding of left kidney on 3/1/24:  He initially started having inermittent left flank pain rated as 7/10 lasting several hours. He went to [facility] and admitted for 2 days (AMA on Feb 10th 2024 ); he had CT abd/p done there. He was told that he needs nephrectomy but he denied since "his mother had similar situation; she had nephrectomy but pathology was benign". He went to [facility] ER on 2/11/24 and had CT abd/p done. He was discharged from ER on the same day. He reports intermittent generalized fatigue, poor appetite for several months, and lost 30 lb in 8 months. He admits intermittent bone/muscle pain for several months. Denied hematuria, urine difficulty, fever, SOB at rest. He is able to walk 1-2 blocks but has   SOB on exertion.    -2/9/24 CT abd/p with IV contrast showing moderate left hydroureteronephrosis extending to the left distal ureter with a previous left ureteral mass. Findings suggestive of rupture of the left renal pelvis with prominent edema and fluid around the left kidney as well as delayed left nephrogram. Multiple enlarged retroperitoneal lymph nodes suspicious for metastases. Multiple scattered lytic and osseous sclerotic lesions concerning for metastatic disease.   -2/9/24 wbc 5.4 Hb 12.1 MCV 78.6% plt 266; K5.3 Cr 0.9 BUN 19 ; Ca 8.5 ALT 79; AST 67  albumin 3.3 Glucose 463.  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 Associated upstream mild hydroureteronephrosis. Extensive perinephric/periureteral stranding and fluid may reflect associated renal pelvic calyceal rupture as reported. Nonspecific renal pelvis and ureteral enhancement as well as bladder wall thickening. Recommend correlation with urinalysis for superimposed infection. Evidence of cheryl and osseous metastatic disease.  3/1/2024 patient had initial consultation and recommended for tissue biopsy, referred to urology. Percocet was prescribed for back pain.  3/1/24 CTA showed no pulmonary embolus, nonspecific bilateral hilar lymph nodes. diffuse skeletal metastases. 3/8/2024 Pt had Bone scan which shows widespread extensive skeletal metastasis.    Medication glipiz//metformin 5-500mg two tablets  bid  losartan one tablet once daily  jardiance 10mg daily   PSHx appendectomy >30-40 yrs ago SHx Denies ETOH/smoking. 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  FHx Denies family history of cancers/malignancies.  Allergy NKDA   3/8 Patient p/w an urgent visit. Son (Conner) and daughter(Ingrid) present. According to patient and family, patient has been having worsening pain, especially left lower back and pelvic pressure which causes left leg numbness. He was not able to walk properly for 3 days now.  Feels nausea and not able to eat well, constipated, groin pressure, difficulty urination. Percocet 5/325 does not improve his pain. plan to see urology (Dr. Jefferson) on 3/19 for tissue biopsy consultation.  3/25 he was admitted to hospital b/o rapidly worsening left flank, back pain and difficulty walking rapidly worsening left flank, back pain and difficulty walking  MRI spine showed cervical cord compression. Discussed to hold off surgery and follow with ortho closely. PSA elevated at 624. Urology consulted placed a stent in left ureter due to outside mass compression to the distal ureter, did not see any mass inside the left ureter. 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 Reports his left shoulder pain was resolved, left hip pain and lower back pain was improved. HIs dexamethasone was tapered off. He is on percocet 5-325 1tab every 88hours as needed and lidocaine topical film.   4/19/24: Patient has b/l leg pain. He says he has had this pain since diagnosis, but feels it has worsened in the last week. He feels the pain when he walks, it is a throbbing sensation in his knees and calves. Patient endorses ongoing numbness in toes, no change from prior, says massage helps. Patient reports muscle twitching in arms and legs, family member endorses that they can visibly see muscles tensing.   Patient reports pain in L upper back near scapula, says it radiates around his axilla to his chest. This pain has been there for the past week. Patient feels the pain at rest and it is worse when he moves. On a scale of 1-10, the pain is rated as a 5, when he takes percocet it  improves to a 4..   Patient endorses shortness of breath on exertion, says this developed in the last three weeks, can walk 50-60 feet before becoming short of breath. He endorses palpitations on exertion. He denies cough and shortness of breath at rest. He denies chest pain.   After chemotherapy patient felt more tired for about the first week, after that he got back to his normal routine but has been doing things a bit slower. Says appetite is okay. Has not had fever or chills. No nausea or vomiting. Is slightly constipated, will sometimes go two days without having a BM. Denies sores in mouth and changes in taste. Denies rashes, does have dry skin. Reports urinary urgency, denies hematuria and dysuria.    5/29/24 reports random chill, hot flash and sweating and fatigue, improving his pain in the left upper back, bilateral legs and left pelvis. ALso states nausea, denies constipation and diarrhea. Also reports skin itchiness in both lower legs in last 4 weeks.   7/1/24 reports feeling better after completing a total of 4 cycles of chemotherapy. He reports hot flashes and sweating, shortness of breath, minor memory loss, pain in the left shoulder blade when moving around. He eats better now.   7/22/24 today is feeling well, Endorses nausea, no vomiting but with occasional constipation. He also notes pimple like cyst around the anus likely external hemorrhoids. He will increase fiber and fluids intake. In the event he is experiencing discomfort then he can use OTC witch hezel to help ease pain. Tolerating Tencentriq/ LUPRON but endorses sweating and cold alternation but tolerable.   8/14/24 pt is doing well, not in acute distress. Dependent on assistive device to get around [cane]. Endorses improvement in anal discomfort since increasing fiber and fluids. Urine flow is strong, and empties bladder completely. Appetite is good and maintaining weight. He continues to take one tablet of oxycodone due to chronic left shoulder pain, and b/l Knee joint pain [provides adequate relieve]  8/30/24 reports left shoulder, left shoulder blade and new pain in the left lower rib cage pain. Had normal appetite and energy level.   9/25/24 Pt returns today with c/o chronic left shoulder pain during movement but not at rest and denies dyspnea.  Appetite is fair and has since lost ~ 7 pounds. Pt advised to eat small frequent meals. Abdominal is soft, ND, + BS. No diarrhea or constipation. Urinary flow is strong and empties bladder completely. denies hematuria or urgency. Nocturia 5 x day, pt to reduce fluid intake during evening hours. He also endorses difficulty completing ADLs task due to shoulder pain.   10/16/24 yesterday devloped a fever 102 relieved by tylenol. So far he is afebrile. Reports multiple joint pain including bilateral hip, elbow and right shoulder. Eats btter. Reports hot flash and no sweating. He is chair bound.   11/6/25 - abiraterone started late Sept 2024 + olaparib started mid Oct 2024. Notes fatigue, with 2-3 naps a day which is stable since before start of abiraterone and olaparib. Ongoing hot flashes. Dizziness and nausea with standing for too long. Needs assistance with ambulation, dressing, bathing. Can walk short distances with a 1 person assist, does not use a walker as it doesn't fit in his house. Appetite is stable. Mild SOB with exertion, resolves with rest. Urine flow and urgency are improved, 3-4x/night. Ongoing pain of left scapula and L posterior ribs, bilateral shoulders, bilateral knees, bilateral ankles, somewhat improved, max pain is 5/10. Taking PRN Percocet 5/325mg occasionally but not every day.   [de-identified] : prostate  [de-identified] : 11/26/24 - abiraterone started late Sept 2024 + olaparib started mid Oct 2024. Ongoing fatigue is somewhat worse, can walk a couple feet with a one person assist before needing to stop and rest. Occasional dizziness with standing. Appetite is improved. Coughing for the past week. Urine flow is "good", urgency at times, 3-4x/night. Notes increased pain in the joints (shoulders, elbows, hips, knees, ankles). Notes a shock like pain in the left upper back/scapula and rib/flank area. All pains are worse with movement and improves with lying down. Max pain is 7/10 with movement. Takes PRN Tylenol for mild pain and Percocet 5-325mg 1 tab per day with good effect.   12/23/24 - abiraterone started late Sept 2024 + olaparib started mid Oct 2024. Ongoing fatigue. Unsteady balance and dizziness at times. Fell on Saturday, he was climbing the second step of stairs and fell back landing on his right shoulder and right wrist. R wrist is swollen with decreased mobility, max pain is 9/10. Left upper back and shoulder pain are somewhat improved, max pain is 3/10. Taking PRN oxycodone 5mg up to 2-3x/day for R wrist pain, not for back pain. Urine flow is "good", notes urgency with occasional leakage., nocturia 4x/night. Denies fever, chills, night sweats, hot flashes, headache, chest pain, palpitations, SOB, cough, nausea/vomiting, diarrhea/constipation, abdominal pain, dysuria, hematuria, LE edema, rash/pruritus, bleeding  1/21/25 - abiraterone started late Sept 2024 + olaparib started mid Oct 2024. Some fatigue at times. Ongoing pains of L scapula and joints are much improved, max pain is 4/10. Using PRN oxycodone 5mg 1-2x/day with good effect. Notes tooth pain, was seen at the dental clinic and reportedly needs 12 extractions done, he is trying to have denture molds made prior to extractions. Intermittent hot flashes. Mild SOB with exertion, resolves with rest. Appetite is "good". Occasional nausea, PRN Zofran is helpful. L sided abdominal pain at times which he attributes to the ureteral stent which is due to be exchanged. Notes urine urgency, occasional hematuria, last episode was a couple weeks ago, some leakage at times, nocturia 4x/night. Denies fever, chills, night sweats, headache, dizziness, chest pain, palpitations, cough, vomiting, diarrhea/constipation, dysuria, incontinence, LE edema, rash/pruritus, bleeding.   2/28/25 reports moderate fatigue, sometimes loose stool 5 times a day, resolved its denies nausea, vomiting and constipation. Reports left ureteral stricture s/p stent placement 3 days ago with Dr. Benavides. His back pain is the same, and left hip pain is better, bilateral shoulder pain gets worse.   3/31/25: Still having pain in his back, left shoulder/upper arm, and his left hip. Right shoulder pain has resolved. Numbness worsening in his left leg over the past month. No fever, chills, unintentional weight loss. PSA has been steadily decreasing, checking again today. Genetic counseling scheduled for next week. Still looking for a way to get him dentures as he needs 12 teeth extracted before he gets Xgeva.  4/29/25 reports improving pain in his back, left shoulder, upper arm and left hip. Endorses unchanged numbness in his left leg.  5/21/25 - ongoing fatigue is stable. Hot flashes at times. Occasional dizziness only with changing positions quickly. Appetite is good. Glucoses over the past month have been poorly controlled. averaging in 250-300's. Occasional nausea, especially after taking abiraterone. Urine flow varies but stable, incontinence at times, nocturia 4x/night, no dysuria/hematuria. Ongoing L upper back and shoulder pains are stable, max pain 5/10, takes PRN oxycodone 5mg at bedtime with adequate relief. Notes intermittent L abdomen pain/pinching sensation which he believes is r/t ureteral stent, due for exchange next month.

## 2025-05-21 NOTE — REASON FOR VISIT
yes [Follow-Up Visit] : a follow-up [Family Member] : family member [FreeTextEntry2] : metastatic castrate resistant prostate cancer

## 2025-06-15 NOTE — REVIEW OF SYSTEMS
[Fever] : no fever [Chills] : no chills [Night Sweats] : no night sweats [Fatigue] : fatigue [Chest Pain] : no chest pain [Palpitations] : no palpitations [Lower Ext Edema] : no lower extremity edema [Shortness Of Breath] : no shortness of breath [Cough] : no cough [Abdominal Pain] : abdominal pain [Vomiting] : no vomiting [Diarrhea: Grade 0] : Diarrhea: Grade 0 [Constipation] : no constipation [Dysuria] : no dysuria [Incontinence] : incontinence [Joint Pain] : joint pain [Joint Stiffness] : joint stiffness [Muscle Pain] : muscle pain [Muscle Weakness] : muscle weakness [Dizziness] : dizziness [Hot Flashes] : hot flashes [Easy Bleeding] : no tendency for easy bleeding [Easy Bruising] : no tendency for easy bruising

## 2025-06-15 NOTE — HISTORY OF PRESENT ILLNESS
[de-identified] : Mr. Marti is a 62 yo M with PMHx of HTN, HLD, DM on metformin, chronic left shoulder/knee/hip joint pain; he had the initial medical oncology consultation for finding of left kidney on 3/1/24:  He initially started having inermittent left flank pain rated as 7/10 lasting several hours. He went to [facility] and admitted for 2 days (AMA on Feb 10th 2024 ); he had CT abd/p done there. He was told that he needs nephrectomy but he denied since "his mother had similar situation; she had nephrectomy but pathology was benign". He went to [facility] ER on 2/11/24 and had CT abd/p done. He was discharged from ER on the same day. He reports intermittent generalized fatigue, poor appetite for several months, and lost 30 lb in 8 months. He admits intermittent bone/muscle pain for several months. Denied hematuria, urine difficulty, fever, SOB at rest. He is able to walk 1-2 blocks but has   SOB on exertion.    -2/9/24 CT abd/p with IV contrast showing moderate left hydroureteronephrosis extending to the left distal ureter with a previous left ureteral mass. Findings suggestive of rupture of the left renal pelvis with prominent edema and fluid around the left kidney as well as delayed left nephrogram. Multiple enlarged retroperitoneal lymph nodes suspicious for metastases. Multiple scattered lytic and osseous sclerotic lesions concerning for metastatic disease.   -2/9/24 wbc 5.4 Hb 12.1 MCV 78.6% plt 266; K5.3 Cr 0.9 BUN 19 ; Ca 8.5 ALT 79; AST 67  albumin 3.3 Glucose 463.  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 Associated upstream mild hydroureteronephrosis. Extensive perinephric/periureteral stranding and fluid may reflect associated renal pelvic calyceal rupture as reported. Nonspecific renal pelvis and ureteral enhancement as well as bladder wall thickening. Recommend correlation with urinalysis for superimposed infection. Evidence of cheryl and osseous metastatic disease.  3/1/2024 patient had initial consultation and recommended for tissue biopsy, referred to urology. Percocet was prescribed for back pain.  3/1/24 CTA showed no pulmonary embolus, nonspecific bilateral hilar lymph nodes. diffuse skeletal metastases. 3/8/2024 Pt had Bone scan which shows widespread extensive skeletal metastasis.    Medication glipiz//metformin 5-500mg two tablets  bid  losartan one tablet once daily  jardiance 10mg daily   PSHx appendectomy >30-40 yrs ago SHx Denies ETOH/smoking. 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  FHx Denies family history of cancers/malignancies.  Allergy NKDA   3/8 Patient p/w an urgent visit. Son (Conner) and daughter(Ingrid) present. According to patient and family, patient has been having worsening pain, especially left lower back and pelvic pressure which causes left leg numbness. He was not able to walk properly for 3 days now.  Feels nausea and not able to eat well, constipated, groin pressure, difficulty urination. Percocet 5/325 does not improve his pain. plan to see urology (Dr. Jefferson) on 3/19 for tissue biopsy consultation.  3/25 he was admitted to hospital b/o rapidly worsening left flank, back pain and difficulty walking rapidly worsening left flank, back pain and difficulty walking  MRI spine showed cervical cord compression. Discussed to hold off surgery and follow with ortho closely. PSA elevated at 624. Urology consulted placed a stent in left ureter due to outside mass compression to the distal ureter, did not see any mass inside the left ureter. 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 Reports his left shoulder pain was resolved, left hip pain and lower back pain was improved. HIs dexamethasone was tapered off. He is on percocet 5-325 1tab every 88hours as needed and lidocaine topical film.   4/19/24: Patient has b/l leg pain. He says he has had this pain since diagnosis, but feels it has worsened in the last week. He feels the pain when he walks, it is a throbbing sensation in his knees and calves. Patient endorses ongoing numbness in toes, no change from prior, says massage helps. Patient reports muscle twitching in arms and legs, family member endorses that they can visibly see muscles tensing.   Patient reports pain in L upper back near scapula, says it radiates around his axilla to his chest. This pain has been there for the past week. Patient feels the pain at rest and it is worse when he moves. On a scale of 1-10, the pain is rated as a 5, when he takes percocet it  improves to a 4..   Patient endorses shortness of breath on exertion, says this developed in the last three weeks, can walk 50-60 feet before becoming short of breath. He endorses palpitations on exertion. He denies cough and shortness of breath at rest. He denies chest pain.   After chemotherapy patient felt more tired for about the first week, after that he got back to his normal routine but has been doing things a bit slower. Says appetite is okay. Has not had fever or chills. No nausea or vomiting. Is slightly constipated, will sometimes go two days without having a BM. Denies sores in mouth and changes in taste. Denies rashes, does have dry skin. Reports urinary urgency, denies hematuria and dysuria.    5/29/24 reports random chill, hot flash and sweating and fatigue, improving his pain in the left upper back, bilateral legs and left pelvis. ALso states nausea, denies constipation and diarrhea. Also reports skin itchiness in both lower legs in last 4 weeks.   7/1/24 reports feeling better after completing a total of 4 cycles of chemotherapy. He reports hot flashes and sweating, shortness of breath, minor memory loss, pain in the left shoulder blade when moving around. He eats better now.   7/22/24 today is feeling well, Endorses nausea, no vomiting but with occasional constipation. He also notes pimple like cyst around the anus likely external hemorrhoids. He will increase fiber and fluids intake. In the event he is experiencing discomfort then he can use OTC witch hezel to help ease pain. Tolerating Tencentriq/ LUPRON but endorses sweating and cold alternation but tolerable.   8/14/24 pt is doing well, not in acute distress. Dependent on assistive device to get around [cane]. Endorses improvement in anal discomfort since increasing fiber and fluids. Urine flow is strong, and empties bladder completely. Appetite is good and maintaining weight. He continues to take one tablet of oxycodone due to chronic left shoulder pain, and b/l Knee joint pain [provides adequate relieve]  8/30/24 reports left shoulder, left shoulder blade and new pain in the left lower rib cage pain. Had normal appetite and energy level.   9/25/24 Pt returns today with c/o chronic left shoulder pain during movement but not at rest and denies dyspnea.  Appetite is fair and has since lost ~ 7 pounds. Pt advised to eat small frequent meals. Abdominal is soft, ND, + BS. No diarrhea or constipation. Urinary flow is strong and empties bladder completely. denies hematuria or urgency. Nocturia 5 x day, pt to reduce fluid intake during evening hours. He also endorses difficulty completing ADLs task due to shoulder pain.   10/16/24 yesterday devloped a fever 102 relieved by tylenol. So far he is afebrile. Reports multiple joint pain including bilateral hip, elbow and right shoulder. Eats btter. Reports hot flash and no sweating. He is chair bound.   11/6/25 - abiraterone started late Sept 2024 + olaparib started mid Oct 2024. Notes fatigue, with 2-3 naps a day which is stable since before start of abiraterone and olaparib. Ongoing hot flashes. Dizziness and nausea with standing for too long. Needs assistance with ambulation, dressing, bathing. Can walk short distances with a 1 person assist, does not use a walker as it doesn't fit in his house. Appetite is stable. Mild SOB with exertion, resolves with rest. Urine flow and urgency are improved, 3-4x/night. Ongoing pain of left scapula and L posterior ribs, bilateral shoulders, bilateral knees, bilateral ankles, somewhat improved, max pain is 5/10. Taking PRN Percocet 5/325mg occasionally but not every day.   [de-identified] : prostate  [de-identified] : 11/26/24 - abiraterone started late Sept 2024 + olaparib started mid Oct 2024. Ongoing fatigue is somewhat worse, can walk a couple feet with a one person assist before needing to stop and rest. Occasional dizziness with standing. Appetite is improved. Coughing for the past week. Urine flow is "good", urgency at times, 3-4x/night. Notes increased pain in the joints (shoulders, elbows, hips, knees, ankles). Notes a shock like pain in the left upper back/scapula and rib/flank area. All pains are worse with movement and improves with lying down. Max pain is 7/10 with movement. Takes PRN Tylenol for mild pain and Percocet 5-325mg 1 tab per day with good effect.   12/23/24 - abiraterone started late Sept 2024 + olaparib started mid Oct 2024. Ongoing fatigue. Unsteady balance and dizziness at times. Fell on Saturday, he was climbing the second step of stairs and fell back landing on his right shoulder and right wrist. R wrist is swollen with decreased mobility, max pain is 9/10. Left upper back and shoulder pain are somewhat improved, max pain is 3/10. Taking PRN oxycodone 5mg up to 2-3x/day for R wrist pain, not for back pain. Urine flow is "good", notes urgency with occasional leakage., nocturia 4x/night. Denies fever, chills, night sweats, hot flashes, headache, chest pain, palpitations, SOB, cough, nausea/vomiting, diarrhea/constipation, abdominal pain, dysuria, hematuria, LE edema, rash/pruritus, bleeding  1/21/25 - abiraterone started late Sept 2024 + olaparib started mid Oct 2024. Some fatigue at times. Ongoing pains of L scapula and joints are much improved, max pain is 4/10. Using PRN oxycodone 5mg 1-2x/day with good effect. Notes tooth pain, was seen at the dental clinic and reportedly needs 12 extractions done, he is trying to have denture molds made prior to extractions. Intermittent hot flashes. Mild SOB with exertion, resolves with rest. Appetite is "good". Occasional nausea, PRN Zofran is helpful. L sided abdominal pain at times which he attributes to the ureteral stent which is due to be exchanged. Notes urine urgency, occasional hematuria, last episode was a couple weeks ago, some leakage at times, nocturia 4x/night. Denies fever, chills, night sweats, headache, dizziness, chest pain, palpitations, cough, vomiting, diarrhea/constipation, dysuria, incontinence, LE edema, rash/pruritus, bleeding.   2/28/25 reports moderate fatigue, sometimes loose stool 5 times a day, resolved its denies nausea, vomiting and constipation. Reports left ureteral stricture s/p stent placement 3 days ago with Dr. Benavides. His back pain is the same, and left hip pain is better, bilateral shoulder pain gets worse.   3/31/25: Still having pain in his back, left shoulder/upper arm, and his left hip. Right shoulder pain has resolved. Numbness worsening in his left leg over the past month. No fever, chills, unintentional weight loss. PSA has been steadily decreasing, checking again today. Genetic counseling scheduled for next week. Still looking for a way to get him dentures as he needs 12 teeth extracted before he gets Xgeva.  4/29/25 reports improving pain in his back, left shoulder, upper arm and left hip. Endorses unchanged numbness in his left leg.  5/21/25 - ongoing fatigue is stable. Hot flashes at times. Occasional dizziness only with changing positions quickly. Appetite is good. Glucoses over the past month have been poorly controlled. averaging in 250-300's. Occasional nausea, especially after taking abiraterone. Urine flow varies but stable, incontinence at times, nocturia 4x/night, no dysuria/hematuria. Ongoing L upper back and shoulder pains are stable, max pain 5/10, takes PRN oxycodone 5mg at bedtime with adequate relief. Notes intermittent L abdomen pain/pinching sensation which he believes is r/t ureteral stent, due for exchange next month.

## 2025-06-15 NOTE — ASSESSMENT
[FreeTextEntry1] : 65-year-old male with metastatic prostate cancer with neuroendocrine differentiation  Initially found to have soft tissue mass within the distal left ureter (3.1 x 1.8 x 3.6 cm) and the rupture of left renal pelvis with edema suspicious for upper tract UCC, mild to moderate left hydroureteronephrosis, multiple retroperitoneal nodes and bone lesions.   In March 2024 he was admitted for worsening back pain and difficulty walking.   MRI spine showed cervical cord compression. Discussed to hold off surgery and follow with ortho closely. PSA elevated at 624. Urology consulted placed a stent in left ureter due to outside mass compression to the distal ureter, did not see any mass inside the left ureter. 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   On 4/1/24 pt was started on carbo/etoposide/tecentriq, plan for 4 cycles. Received first dose of lupron 4/3/24.  5/28/24 DEXA scan showed normal density.   9/10/24 CT CAP: Diffuse metastatic disease of the bones is markedly increased since prior exams. Previously noted enlarged hilar lymph nodes are now normal in size. Delayed left nephrogram with mild to moderate left hydroureter and hydronephrosis as well as some mucosal hyperemia (of infectious or inflammatory etiology). Previously present left perinephric stranding has markedly decreased.  9/10/24 NM Bone Scan: Compared to the bone scan in 3/2024, there is overall less intense and less heterogeneous foci of increased activity in the skeleton suggesting partial favorable response to therapy.   2/13/25 CT c/a/p showed Diffuse metastatic disease of the bones is more prominent since the prior exam No evidence of mediastinal lymphadenopathy. Previously noted slightly more prominent moderate left hydroureter and hydronephrosis with persistent delayed left nephrogram. Bone scan showed Again interval improvement in patchy uptake along the axial and appendicular skeleton. No new bony lesions.   Plan   Prostate neuroendocrine adenocarcinoma: - Continue Lupron [id number], last given 4/29/25, next due July 2025 - Foundation tissue Dx: BRCA2 loss, BRIP *, myc amplification, Rb loss and p53 mutation - Continue abiraterone 1,000mg daily + prednisone daily (started Sept 2024) and olaparib 300mg bid (started Oct 2024). Reviewed potential AEs not limited to hypertension, abnormal liver function, edema, arthralgia, diarrhea, skin rash, hyperglycemia, cytopenias, fatigue, nausea/vomiting, decreased appetite.  - Will reduce prednisone to 2.5mg daily given poorly controlled glucoses in the 250-300 range.  - PSA was 359 on 11/6/24 at start of treatment. PSA eli was 87 on 3/31/25. PSA was 105 on 4/29/25. Repeat PSA today is pending.  - Clinically he's had a good response with decreased pain in the setting of a declining PSA and chromogranin A. - Repeat CT chest/abd/pelvis and bone scan due now, ordered today 5/21/25.   Bone mets: - Dexa scan on 5/29/24 reports normal bone density. 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 Potential side effects reviewed including but not limited to hypocalcemia and ONJ. Instructed to obtain dental clearance prior to start of Xgeva, he reportedly needs 12 teeth extracted. Still need to find a way to get him dentures; he will advise us once complete.  Pain  - Cancer related pains of the left upper back and joints are improved, max pain is 5/10. Taking PRN oxycodone 5mg up to 2x/day with good effect. - Continue PRN Tylenol per package insert and PRN oxycodone 5mg q8hrs for moderate to severe pain. Bowel regimen in place.   Instructed to contact our office with any new/worsening symptoms. Pt and family educated regarding plan of care, all questions/concerns addressed to the best of my abilities and their apparent satisfaction. RTC 4wks [Future Reassessment of Pain Scale] : Future reassessment of pain scale    [Medication(s)] : Medication(s) [Designated Health Care Proxy] : Designated Health Care Proxy [Name: ___] : Name: [unfilled] [Relationship: ___] : Relationship: [unfilled]

## 2025-06-15 NOTE — PHYSICAL EXAM
[Ambulatory and capable of all self care but unable to carry out any work activities] : Status 2- Ambulatory and capable of all self care but unable to carry out any work activities. Up and about more than 50% of waking hours [Normal] : affect appropriate [de-identified] : anicteric  [de-identified] : mild discomfort with palpation of lumbar spine [de-identified] : no LE edema

## 2025-07-28 NOTE — HISTORY OF PRESENT ILLNESS
[de-identified] : Mr. Marti is a 64 yo M with PMHx of HTN, HLD, DM on metformin, chronic left shoulder/knee/hip joint pain; he had the initial medical oncology consultation for finding of left kidney on 3/1/24:  He initially started having inermittent left flank pain rated as 7/10 lasting several hours. He went to [facility] and admitted for 2 days (AMA on Feb 10th 2024 ); he had CT abd/p done there. He was told that he needs nephrectomy but he denied since "his mother had similar situation; she had nephrectomy but pathology was benign". He went to [facility] ER on 2/11/24 and had CT abd/p done. He was discharged from ER on the same day. He reports intermittent generalized fatigue, poor appetite for several months, and lost 30 lb in 8 months. He admits intermittent bone/muscle pain for several months. Denied hematuria, urine difficulty, fever, SOB at rest. He is able to walk 1-2 blocks but has   SOB on exertion.    -2/9/24 CT abd/p with IV contrast showing moderate left hydroureteronephrosis extending to the left distal ureter with a previous left ureteral mass. Findings suggestive of rupture of the left renal pelvis with prominent edema and fluid around the left kidney as well as delayed left nephrogram. Multiple enlarged retroperitoneal lymph nodes suspicious for metastases. Multiple scattered lytic and osseous sclerotic lesions concerning for metastatic disease.   -2/9/24 wbc 5.4 Hb 12.1 MCV 78.6% plt 266; K5.3 Cr 0.9 BUN 19 ; Ca 8.5 ALT 79; AST 67  albumin 3.3 Glucose 463.  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 Associated upstream mild hydroureteronephrosis. Extensive perinephric/periureteral stranding and fluid may reflect associated renal pelvic calyceal rupture as reported. Nonspecific renal pelvis and ureteral enhancement as well as bladder wall thickening. Recommend correlation with urinalysis for superimposed infection. Evidence of cheryl and osseous metastatic disease.  3/1/2024 patient had initial consultation and recommended for tissue biopsy, referred to urology. Percocet was prescribed for back pain.  3/1/24 CTA showed no pulmonary embolus, nonspecific bilateral hilar lymph nodes. diffuse skeletal metastases. 3/8/2024 Pt had Bone scan which shows widespread extensive skeletal metastasis.    Medication glipiz//metformin 5-500mg two tablets  bid  losartan one tablet once daily  jardiance 10mg daily   PSHx appendectomy >30-40 yrs ago SHx Denies ETOH/smoking. 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  FHx Denies family history of cancers/malignancies.  Allergy NKDA   3/8 Patient p/w an urgent visit. Son (Conner) and daughter(Ingrid) present. According to patient and family, patient has been having worsening pain, especially left lower back and pelvic pressure which causes left leg numbness. He was not able to walk properly for 3 days now.  Feels nausea and not able to eat well, constipated, groin pressure, difficulty urination. Percocet 5/325 does not improve his pain. plan to see urology (Dr. Jefferson) on 3/19 for tissue biopsy consultation.  3/25 he was admitted to hospital b/o rapidly worsening left flank, back pain and difficulty walking rapidly worsening left flank, back pain and difficulty walking  MRI spine showed cervical cord compression. Discussed to hold off surgery and follow with ortho closely. PSA elevated at 624. Urology consulted placed a stent in left ureter due to outside mass compression to the distal ureter, did not see any mass inside the left ureter. 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 Reports his left shoulder pain was resolved, left hip pain and lower back pain was improved. HIs dexamethasone was tapered off. He is on percocet 5-325 1tab every 88hours as needed and lidocaine topical film.   4/19/24: Patient has b/l leg pain. He says he has had this pain since diagnosis, but feels it has worsened in the last week. He feels the pain when he walks, it is a throbbing sensation in his knees and calves. Patient endorses ongoing numbness in toes, no change from prior, says massage helps. Patient reports muscle twitching in arms and legs, family member endorses that they can visibly see muscles tensing.   Patient reports pain in L upper back near scapula, says it radiates around his axilla to his chest. This pain has been there for the past week. Patient feels the pain at rest and it is worse when he moves. On a scale of 1-10, the pain is rated as a 5, when he takes percocet it  improves to a 4..   Patient endorses shortness of breath on exertion, says this developed in the last three weeks, can walk 50-60 feet before becoming short of breath. He endorses palpitations on exertion. He denies cough and shortness of breath at rest. He denies chest pain.   After chemotherapy patient felt more tired for about the first week, after that he got back to his normal routine but has been doing things a bit slower. Says appetite is okay. Has not had fever or chills. No nausea or vomiting. Is slightly constipated, will sometimes go two days without having a BM. Denies sores in mouth and changes in taste. Denies rashes, does have dry skin. Reports urinary urgency, denies hematuria and dysuria.    5/29/24 reports random chill, hot flash and sweating and fatigue, improving his pain in the left upper back, bilateral legs and left pelvis. ALso states nausea, denies constipation and diarrhea. Also reports skin itchiness in both lower legs in last 4 weeks.   7/1/24 reports feeling better after completing a total of 4 cycles of chemotherapy. He reports hot flashes and sweating, shortness of breath, minor memory loss, pain in the left shoulder blade when moving around. He eats better now.   7/22/24 today is feeling well, Endorses nausea, no vomiting but with occasional constipation. He also notes pimple like cyst around the anus likely external hemorrhoids. He will increase fiber and fluids intake. In the event he is experiencing discomfort then he can use OTC witch hezel to help ease pain. Tolerating Tencentriq/ LUPRON but endorses sweating and cold alternation but tolerable.   8/14/24 pt is doing well, not in acute distress. Dependent on assistive device to get around [cane]. Endorses improvement in anal discomfort since increasing fiber and fluids. Urine flow is strong, and empties bladder completely. Appetite is good and maintaining weight. He continues to take one tablet of oxycodone due to chronic left shoulder pain, and b/l Knee joint pain [provides adequate relieve]  8/30/24 reports left shoulder, left shoulder blade and new pain in the left lower rib cage pain. Had normal appetite and energy level.   9/25/24 Pt returns today with c/o chronic left shoulder pain during movement but not at rest and denies dyspnea.  Appetite is fair and has since lost ~ 7 pounds. Pt advised to eat small frequent meals. Abdominal is soft, ND, + BS. No diarrhea or constipation. Urinary flow is strong and empties bladder completely. denies hematuria or urgency. Nocturia 5 x day, pt to reduce fluid intake during evening hours. He also endorses difficulty completing ADLs task due to shoulder pain.   10/16/24 yesterday devloped a fever 102 relieved by tylenol. So far he is afebrile. Reports multiple joint pain including bilateral hip, elbow and right shoulder. Eats btter. Reports hot flash and no sweating. He is chair bound.   11/6/25 - abiraterone started late Sept 2024 + olaparib started mid Oct 2024. Notes fatigue, with 2-3 naps a day which is stable since before start of abiraterone and olaparib. Ongoing hot flashes. Dizziness and nausea with standing for too long. Needs assistance with ambulation, dressing, bathing. Can walk short distances with a 1 person assist, does not use a walker as it doesn't fit in his house. Appetite is stable. Mild SOB with exertion, resolves with rest. Urine flow and urgency are improved, 3-4x/night. Ongoing pain of left scapula and L posterior ribs, bilateral shoulders, bilateral knees, bilateral ankles, somewhat improved, max pain is 5/10. Taking PRN Percocet 5/325mg occasionally but not every day.   [de-identified] : prostate  [de-identified] : 11/26/24 - abiraterone started late Sept 2024 + olaparib started mid Oct 2024. Ongoing fatigue is somewhat worse, can walk a couple feet with a one person assist before needing to stop and rest. Occasional dizziness with standing. Appetite is improved. Coughing for the past week. Urine flow is "good", urgency at times, 3-4x/night. Notes increased pain in the joints (shoulders, elbows, hips, knees, ankles). Notes a shock like pain in the left upper back/scapula and rib/flank area. All pains are worse with movement and improves with lying down. Max pain is 7/10 with movement. Takes PRN Tylenol for mild pain and Percocet 5-325mg 1 tab per day with good effect.   12/23/24 - abiraterone started late Sept 2024 + olaparib started mid Oct 2024. Ongoing fatigue. Unsteady balance and dizziness at times. Fell on Saturday, he was climbing the second step of stairs and fell back landing on his right shoulder and right wrist. R wrist is swollen with decreased mobility, max pain is 9/10. Left upper back and shoulder pain are somewhat improved, max pain is 3/10. Taking PRN oxycodone 5mg up to 2-3x/day for R wrist pain, not for back pain. Urine flow is "good", notes urgency with occasional leakage., nocturia 4x/night. Denies fever, chills, night sweats, hot flashes, headache, chest pain, palpitations, SOB, cough, nausea/vomiting, diarrhea/constipation, abdominal pain, dysuria, hematuria, LE edema, rash/pruritus, bleeding  1/21/25 - abiraterone started late Sept 2024 + olaparib started mid Oct 2024. Some fatigue at times. Ongoing pains of L scapula and joints are much improved, max pain is 4/10. Using PRN oxycodone 5mg 1-2x/day with good effect. Notes tooth pain, was seen at the dental clinic and reportedly needs 12 extractions done, he is trying to have denture molds made prior to extractions. Intermittent hot flashes. Mild SOB with exertion, resolves with rest. Appetite is "good". Occasional nausea, PRN Zofran is helpful. L sided abdominal pain at times which he attributes to the ureteral stent which is due to be exchanged. Notes urine urgency, occasional hematuria, last episode was a couple weeks ago, some leakage at times, nocturia 4x/night. Denies fever, chills, night sweats, headache, dizziness, chest pain, palpitations, cough, vomiting, diarrhea/constipation, dysuria, incontinence, LE edema, rash/pruritus, bleeding.   2/28/25 reports moderate fatigue, sometimes loose stool 5 times a day, resolved its denies nausea, vomiting and constipation. Reports left ureteral stricture s/p stent placement 3 days ago with Dr. Benavides. His back pain is the same, and left hip pain is better, bilateral shoulder pain gets worse.   3/31/25: Still having pain in his back, left shoulder/upper arm, and his left hip. Right shoulder pain has resolved. Numbness worsening in his left leg over the past month. No fever, chills, unintentional weight loss. PSA has been steadily decreasing, checking again today. Genetic counseling scheduled for next week. Still looking for a way to get him dentures as he needs 12 teeth extracted before he gets Xgeva.  4/29/25 reports improving pain in his back, left shoulder, upper arm and left hip. Endorses unchanged numbness in his left leg.  5/21/25 - ongoing fatigue is stable. Hot flashes at times. Occasional dizziness only with changing positions quickly. Appetite is good. Glucoses over the past month have been poorly controlled. averaging in 250-300's. Occasional nausea, especially after taking abiraterone. Urine flow varies but stable, incontinence at times, nocturia 4x/night, no dysuria/hematuria. Ongoing L upper back and shoulder pains are stable, max pain 5/10, takes PRN oxycodone 5mg at bedtime with adequate relief. Notes intermittent L abdomen pain/pinching sensation which he believes is r/t ureteral stent, due for exchange next month.   7/28/25 - ongoing fatigue is slightly increased. Ongoing mid-to-right sided low back pain is overall stable. Bilateral shoulder pains are somewhat improved. Bilateral posterior hip pains, worse with standing up and improved with sitting on the couch. Max pains are 4-5/10, taking PRN oxycodone 5mg 1-2x/day. Notes persistent numbness of L thigh radiating down to the calf, no saddle anesthesia and intermittent urine incontinence is somewhat improved. Appetite is stable. Occasional constipation is manageable with PRN Senna. Urine flow is stable, no hematuria/dysuria.

## 2025-07-28 NOTE — PHYSICAL EXAM
[Capable of only limited self care, confined to bed or chair more than 50% of waking hours] : Status 3- Capable of only limited self care, confined to bed or chair more than 50% of waking hours [Normal] : affect appropriate [de-identified] : anicteric  [de-identified] : no LE edema  [de-identified] : ambulates with a walker

## 2025-07-28 NOTE — ASSESSMENT
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   In March 2024 he was admitted for worsening back pain and difficulty walking.   MRI spine showed cervical cord compression. Discussed to hold off surgery and follow with ortho closely. PSA elevated at 624. Urology consulted placed a stent in left ureter due to outside mass compression to the distal ureter, did not see any mass inside the left ureter. 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   On 4/1/24 pt was started on carbo/etoposide/tecentriq, plan for 4 cycles. Received first dose of lupron 4/3/24.  5/28/24 DEXA scan showed normal density.   9/10/24 CT CAP: Diffuse metastatic disease of the bones is markedly increased since prior exams. Previously noted enlarged hilar lymph nodes are now normal in size. Delayed left nephrogram with mild to moderate left hydroureter and hydronephrosis as well as some mucosal hyperemia (of infectious or inflammatory etiology). Previously present left perinephric stranding has markedly decreased.  9/10/24 NM Bone Scan: Compared to the bone scan in 3/2024, there is overall less intense and less heterogeneous foci of increased activity in the skeleton suggesting partial favorable response to therapy.   2/13/25 CT c/a/p showed Diffuse metastatic disease of the bones is more prominent since the prior exam No evidence of mediastinal lymphadenopathy. Previously noted slightly more prominent moderate left hydroureter and hydronephrosis with persistent delayed left nephrogram. Bone scan showed Again interval improvement in patchy uptake along the axial and appendicular skeleton. No new bony lesions.   Plan   Prostate neuroendocrine adenocarcinoma: - Foundation tissue Dx: BRCA2 loss, BRIP *, myc amplification, Rb loss and p53 mutation - Continue Lupron [id number], last given 7/28/25, next due late Oct 2025.  - Continue abiraterone 1,000mg daily + prednisone daily (started Sept 2024) and olaparib 300mg bid (started Oct 2024). Reviewed potential AEs not limited to hypertension, abnormal liver function, edema, arthralgia, diarrhea, skin rash, hyperglycemia, cytopenias, fatigue, nausea/vomiting, decreased appetite.  - Continue prednisone to 2.5mg daily, reduced given poorly controlled glucoses in the 250-300 range.  - PSA was 359 on 11/6/24 at start of treatment. PSA eli was 87 on 3/31/25. PSA was 105 on 4/29/25, 107 on 5/21/25. Repeat PSA today is pending.  - Repeat CT chest/abd/pelvis and bone scan ordered 5/21/25, not yet done. Instructed to schedule scans asap and given phone # to radiology today 7/28/25.    Bone mets: - Dexa scan on 5/29/24 reports normal bone density. 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 Potential side effects reviewed including but not limited to hypocalcemia and ONJ. Instructed to obtain dental clearance prior to start of Xgeva, he reportedly needs 12 teeth extracted. Still need to find a way to get him dentures; he will advise us once complete.  Pain  - Cancer related pains of the left upper back and joints are improved, max pain is 5/10. Taking PRN oxycodone 5mg up to 2x/day with good effect. - Continue PRN Tylenol per package insert and PRN oxycodone 5mg q8hrs for moderate to severe pain. Bowel regimen in place.   LLE numbness: - Notes persistent numbness of L thigh radiating down to the calf, started approx 2-3wks ago, stable long standing low back pain. No saddle anesthesia or stool incontinence. Intermittent urine incontinence is actually somewhat improved. - Will obtain MRI L spine asap for further eval, r/o epidural extension of disease.   Instructed to contact our office with any new/worsening symptoms. Pt and family educated regarding plan of care, all questions/concerns addressed to the best of my abilities and their apparent satisfaction. RTC 1mo

## 2025-07-28 NOTE — REVIEW OF SYSTEMS
[Fatigue] : fatigue [Abdominal Pain] : abdominal pain [Constipation] : constipation [Diarrhea: Grade 0] : Diarrhea: Grade 0 [Incontinence] : incontinence [Joint Pain] : joint pain [Joint Stiffness] : joint stiffness [Muscle Pain] : muscle pain [Muscle Weakness] : muscle weakness [Fever] : no fever [Chills] : no chills [Night Sweats] : no night sweats [Chest Pain] : no chest pain [Palpitations] : no palpitations [Lower Ext Edema] : no lower extremity edema [Shortness Of Breath] : no shortness of breath [Cough] : no cough [Vomiting] : no vomiting [Dysuria] : no dysuria [Dizziness] : no dizziness [Hot Flashes] : no hot flashes [Easy Bleeding] : no tendency for easy bleeding [Easy Bruising] : no tendency for easy bruising

## 2025-07-28 NOTE — ASSESSMENT
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   In March 2024 he was admitted for worsening back pain and difficulty walking.   MRI spine showed cervical cord compression. Discussed to hold off surgery and follow with ortho closely. PSA elevated at 624. Urology consulted placed a stent in left ureter due to outside mass compression to the distal ureter, did not see any mass inside the left ureter. 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   On 4/1/24 pt was started on carbo/etoposide/tecentriq, plan for 4 cycles. Received first dose of lupron 4/3/24.  5/28/24 DEXA scan showed normal density.   9/10/24 CT CAP: Diffuse metastatic disease of the bones is markedly increased since prior exams. Previously noted enlarged hilar lymph nodes are now normal in size. Delayed left nephrogram with mild to moderate left hydroureter and hydronephrosis as well as some mucosal hyperemia (of infectious or inflammatory etiology). Previously present left perinephric stranding has markedly decreased.  9/10/24 NM Bone Scan: Compared to the bone scan in 3/2024, there is overall less intense and less heterogeneous foci of increased activity in the skeleton suggesting partial favorable response to therapy.   2/13/25 CT c/a/p showed Diffuse metastatic disease of the bones is more prominent since the prior exam No evidence of mediastinal lymphadenopathy. Previously noted slightly more prominent moderate left hydroureter and hydronephrosis with persistent delayed left nephrogram. Bone scan showed Again interval improvement in patchy uptake along the axial and appendicular skeleton. No new bony lesions.   Plan   Prostate neuroendocrine adenocarcinoma: - Foundation tissue Dx: BRCA2 loss, BRIP *, myc amplification, Rb loss and p53 mutation - Continue Lupron [id number], last given 7/28/25, next due late Oct 2025.  - Continue abiraterone 1,000mg daily + prednisone daily (started Sept 2024) and olaparib 300mg bid (started Oct 2024). Reviewed potential AEs not limited to hypertension, abnormal liver function, edema, arthralgia, diarrhea, skin rash, hyperglycemia, cytopenias, fatigue, nausea/vomiting, decreased appetite.  - Continue prednisone to 2.5mg daily, reduced given poorly controlled glucoses in the 250-300 range.  - PSA was 359 on 11/6/24 at start of treatment. PSA eli was 87 on 3/31/25. PSA was 105 on 4/29/25, 107 on 5/21/25. Repeat PSA today is pending.  - Repeat CT chest/abd/pelvis and bone scan ordered 5/21/25, not yet done. Instructed to schedule scans asap and given phone # to radiology today 7/28/25.    Bone mets: - Dexa scan on 5/29/24 reports normal bone density. 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 Potential side effects reviewed including but not limited to hypocalcemia and ONJ. Instructed to obtain dental clearance prior to start of Xgeva, he reportedly needs 12 teeth extracted. Still need to find a way to get him dentures; he will advise us once complete.  Pain  - Cancer related pains of the left upper back and joints are improved, max pain is 5/10. Taking PRN oxycodone 5mg up to 2x/day with good effect. - Continue PRN Tylenol per package insert and PRN oxycodone 5mg q8hrs for moderate to severe pain. Bowel regimen in place.   LLE numbness: - Notes persistent numbness of L thigh radiating down to the calf, started approx 2-3wks ago, stable long standing low back pain. No saddle anesthesia or stool incontinence. Intermittent urine incontinence is actually somewhat improved. - Will obtain MRI L spine asap for further eval, r/o epidural extension of disease.   Instructed to contact our office with any new/worsening symptoms. Pt and family educated regarding plan of care, all questions/concerns addressed to the best of my abilities and their apparent satisfaction. RTC 1mo

## 2025-07-28 NOTE — HISTORY OF PRESENT ILLNESS
[de-identified] : Mr. Marti is a 62 yo M with PMHx of HTN, HLD, DM on metformin, chronic left shoulder/knee/hip joint pain; he had the initial medical oncology consultation for finding of left kidney on 3/1/24:  He initially started having inermittent left flank pain rated as 7/10 lasting several hours. He went to [facility] and admitted for 2 days (AMA on Feb 10th 2024 ); he had CT abd/p done there. He was told that he needs nephrectomy but he denied since "his mother had similar situation; she had nephrectomy but pathology was benign". He went to [facility] ER on 2/11/24 and had CT abd/p done. He was discharged from ER on the same day. He reports intermittent generalized fatigue, poor appetite for several months, and lost 30 lb in 8 months. He admits intermittent bone/muscle pain for several months. Denied hematuria, urine difficulty, fever, SOB at rest. He is able to walk 1-2 blocks but has   SOB on exertion.    -2/9/24 CT abd/p with IV contrast showing moderate left hydroureteronephrosis extending to the left distal ureter with a previous left ureteral mass. Findings suggestive of rupture of the left renal pelvis with prominent edema and fluid around the left kidney as well as delayed left nephrogram. Multiple enlarged retroperitoneal lymph nodes suspicious for metastases. Multiple scattered lytic and osseous sclerotic lesions concerning for metastatic disease.   -2/9/24 wbc 5.4 Hb 12.1 MCV 78.6% plt 266; K5.3 Cr 0.9 BUN 19 ; Ca 8.5 ALT 79; AST 67  albumin 3.3 Glucose 463.  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 Associated upstream mild hydroureteronephrosis. Extensive perinephric/periureteral stranding and fluid may reflect associated renal pelvic calyceal rupture as reported. Nonspecific renal pelvis and ureteral enhancement as well as bladder wall thickening. Recommend correlation with urinalysis for superimposed infection. Evidence of cheryl and osseous metastatic disease.  3/1/2024 patient had initial consultation and recommended for tissue biopsy, referred to urology. Percocet was prescribed for back pain.  3/1/24 CTA showed no pulmonary embolus, nonspecific bilateral hilar lymph nodes. diffuse skeletal metastases. 3/8/2024 Pt had Bone scan which shows widespread extensive skeletal metastasis.    Medication glipiz//metformin 5-500mg two tablets  bid  losartan one tablet once daily  jardiance 10mg daily   PSHx appendectomy >30-40 yrs ago SHx Denies ETOH/smoking. 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  FHx Denies family history of cancers/malignancies.  Allergy NKDA   3/8 Patient p/w an urgent visit. Son (Conner) and daughter(Ingrid) present. According to patient and family, patient has been having worsening pain, especially left lower back and pelvic pressure which causes left leg numbness. He was not able to walk properly for 3 days now.  Feels nausea and not able to eat well, constipated, groin pressure, difficulty urination. Percocet 5/325 does not improve his pain. plan to see urology (Dr. Jefferson) on 3/19 for tissue biopsy consultation.  3/25 he was admitted to hospital b/o rapidly worsening left flank, back pain and difficulty walking rapidly worsening left flank, back pain and difficulty walking  MRI spine showed cervical cord compression. Discussed to hold off surgery and follow with ortho closely. PSA elevated at 624. Urology consulted placed a stent in left ureter due to outside mass compression to the distal ureter, did not see any mass inside the left ureter. 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 Reports his left shoulder pain was resolved, left hip pain and lower back pain was improved. HIs dexamethasone was tapered off. He is on percocet 5-325 1tab every 88hours as needed and lidocaine topical film.   4/19/24: Patient has b/l leg pain. He says he has had this pain since diagnosis, but feels it has worsened in the last week. He feels the pain when he walks, it is a throbbing sensation in his knees and calves. Patient endorses ongoing numbness in toes, no change from prior, says massage helps. Patient reports muscle twitching in arms and legs, family member endorses that they can visibly see muscles tensing.   Patient reports pain in L upper back near scapula, says it radiates around his axilla to his chest. This pain has been there for the past week. Patient feels the pain at rest and it is worse when he moves. On a scale of 1-10, the pain is rated as a 5, when he takes percocet it  improves to a 4..   Patient endorses shortness of breath on exertion, says this developed in the last three weeks, can walk 50-60 feet before becoming short of breath. He endorses palpitations on exertion. He denies cough and shortness of breath at rest. He denies chest pain.   After chemotherapy patient felt more tired for about the first week, after that he got back to his normal routine but has been doing things a bit slower. Says appetite is okay. Has not had fever or chills. No nausea or vomiting. Is slightly constipated, will sometimes go two days without having a BM. Denies sores in mouth and changes in taste. Denies rashes, does have dry skin. Reports urinary urgency, denies hematuria and dysuria.    5/29/24 reports random chill, hot flash and sweating and fatigue, improving his pain in the left upper back, bilateral legs and left pelvis. ALso states nausea, denies constipation and diarrhea. Also reports skin itchiness in both lower legs in last 4 weeks.   7/1/24 reports feeling better after completing a total of 4 cycles of chemotherapy. He reports hot flashes and sweating, shortness of breath, minor memory loss, pain in the left shoulder blade when moving around. He eats better now.   7/22/24 today is feeling well, Endorses nausea, no vomiting but with occasional constipation. He also notes pimple like cyst around the anus likely external hemorrhoids. He will increase fiber and fluids intake. In the event he is experiencing discomfort then he can use OTC witch hezel to help ease pain. Tolerating Tencentriq/ LUPRON but endorses sweating and cold alternation but tolerable.   8/14/24 pt is doing well, not in acute distress. Dependent on assistive device to get around [cane]. Endorses improvement in anal discomfort since increasing fiber and fluids. Urine flow is strong, and empties bladder completely. Appetite is good and maintaining weight. He continues to take one tablet of oxycodone due to chronic left shoulder pain, and b/l Knee joint pain [provides adequate relieve]  8/30/24 reports left shoulder, left shoulder blade and new pain in the left lower rib cage pain. Had normal appetite and energy level.   9/25/24 Pt returns today with c/o chronic left shoulder pain during movement but not at rest and denies dyspnea.  Appetite is fair and has since lost ~ 7 pounds. Pt advised to eat small frequent meals. Abdominal is soft, ND, + BS. No diarrhea or constipation. Urinary flow is strong and empties bladder completely. denies hematuria or urgency. Nocturia 5 x day, pt to reduce fluid intake during evening hours. He also endorses difficulty completing ADLs task due to shoulder pain.   10/16/24 yesterday devloped a fever 102 relieved by tylenol. So far he is afebrile. Reports multiple joint pain including bilateral hip, elbow and right shoulder. Eats btter. Reports hot flash and no sweating. He is chair bound.   11/6/25 - abiraterone started late Sept 2024 + olaparib started mid Oct 2024. Notes fatigue, with 2-3 naps a day which is stable since before start of abiraterone and olaparib. Ongoing hot flashes. Dizziness and nausea with standing for too long. Needs assistance with ambulation, dressing, bathing. Can walk short distances with a 1 person assist, does not use a walker as it doesn't fit in his house. Appetite is stable. Mild SOB with exertion, resolves with rest. Urine flow and urgency are improved, 3-4x/night. Ongoing pain of left scapula and L posterior ribs, bilateral shoulders, bilateral knees, bilateral ankles, somewhat improved, max pain is 5/10. Taking PRN Percocet 5/325mg occasionally but not every day.   [de-identified] : prostate  [de-identified] : 11/26/24 - abiraterone started late Sept 2024 + olaparib started mid Oct 2024. Ongoing fatigue is somewhat worse, can walk a couple feet with a one person assist before needing to stop and rest. Occasional dizziness with standing. Appetite is improved. Coughing for the past week. Urine flow is "good", urgency at times, 3-4x/night. Notes increased pain in the joints (shoulders, elbows, hips, knees, ankles). Notes a shock like pain in the left upper back/scapula and rib/flank area. All pains are worse with movement and improves with lying down. Max pain is 7/10 with movement. Takes PRN Tylenol for mild pain and Percocet 5-325mg 1 tab per day with good effect.   12/23/24 - abiraterone started late Sept 2024 + olaparib started mid Oct 2024. Ongoing fatigue. Unsteady balance and dizziness at times. Fell on Saturday, he was climbing the second step of stairs and fell back landing on his right shoulder and right wrist. R wrist is swollen with decreased mobility, max pain is 9/10. Left upper back and shoulder pain are somewhat improved, max pain is 3/10. Taking PRN oxycodone 5mg up to 2-3x/day for R wrist pain, not for back pain. Urine flow is "good", notes urgency with occasional leakage., nocturia 4x/night. Denies fever, chills, night sweats, hot flashes, headache, chest pain, palpitations, SOB, cough, nausea/vomiting, diarrhea/constipation, abdominal pain, dysuria, hematuria, LE edema, rash/pruritus, bleeding  1/21/25 - abiraterone started late Sept 2024 + olaparib started mid Oct 2024. Some fatigue at times. Ongoing pains of L scapula and joints are much improved, max pain is 4/10. Using PRN oxycodone 5mg 1-2x/day with good effect. Notes tooth pain, was seen at the dental clinic and reportedly needs 12 extractions done, he is trying to have denture molds made prior to extractions. Intermittent hot flashes. Mild SOB with exertion, resolves with rest. Appetite is "good". Occasional nausea, PRN Zofran is helpful. L sided abdominal pain at times which he attributes to the ureteral stent which is due to be exchanged. Notes urine urgency, occasional hematuria, last episode was a couple weeks ago, some leakage at times, nocturia 4x/night. Denies fever, chills, night sweats, headache, dizziness, chest pain, palpitations, cough, vomiting, diarrhea/constipation, dysuria, incontinence, LE edema, rash/pruritus, bleeding.   2/28/25 reports moderate fatigue, sometimes loose stool 5 times a day, resolved its denies nausea, vomiting and constipation. Reports left ureteral stricture s/p stent placement 3 days ago with Dr. Benavides. His back pain is the same, and left hip pain is better, bilateral shoulder pain gets worse.   3/31/25: Still having pain in his back, left shoulder/upper arm, and his left hip. Right shoulder pain has resolved. Numbness worsening in his left leg over the past month. No fever, chills, unintentional weight loss. PSA has been steadily decreasing, checking again today. Genetic counseling scheduled for next week. Still looking for a way to get him dentures as he needs 12 teeth extracted before he gets Xgeva.  4/29/25 reports improving pain in his back, left shoulder, upper arm and left hip. Endorses unchanged numbness in his left leg.  5/21/25 - ongoing fatigue is stable. Hot flashes at times. Occasional dizziness only with changing positions quickly. Appetite is good. Glucoses over the past month have been poorly controlled. averaging in 250-300's. Occasional nausea, especially after taking abiraterone. Urine flow varies but stable, incontinence at times, nocturia 4x/night, no dysuria/hematuria. Ongoing L upper back and shoulder pains are stable, max pain 5/10, takes PRN oxycodone 5mg at bedtime with adequate relief. Notes intermittent L abdomen pain/pinching sensation which he believes is r/t ureteral stent, due for exchange next month.   7/28/25 - ongoing fatigue is slightly increased. Ongoing mid-to-right sided low back pain is overall stable. Bilateral shoulder pains are somewhat improved. Bilateral posterior hip pains, worse with standing up and improved with sitting on the couch. Max pains are 4-5/10, taking PRN oxycodone 5mg 1-2x/day. Notes persistent numbness of L thigh radiating down to the calf, no saddle anesthesia and intermittent urine incontinence is somewhat improved. Appetite is stable. Occasional constipation is manageable with PRN Senna. Urine flow is stable, no hematuria/dysuria.

## 2025-07-28 NOTE — PHYSICAL EXAM
[Capable of only limited self care, confined to bed or chair more than 50% of waking hours] : Status 3- Capable of only limited self care, confined to bed or chair more than 50% of waking hours [Normal] : affect appropriate [de-identified] : anicteric  [de-identified] : no LE edema  [de-identified] : ambulates with a walker

## 2025-07-30 NOTE — HISTORY OF PRESENT ILLNESS
[FreeTextEntry1] :  63 year old male presenting for type 2 DM and steroid induced hyperglycemia.        Diabetes History: Diagnosed for the past 1-2 years was controlled but has been on courses of high dose steroids that have made it difficult to control. Patient was admittied to [facility] in March found to have extensive metastatic disease of prostate CA to the spine and bones throughout. Was on high dose steroid taper and started on insulin in the hospital. Prior to hospitalization was on Glip-metformin and Jardiance          Interval Hx:  Was on 5mg prednisone and dropped to 2.5mg 2 days ago in setting of elevated glucose. Denies any current changes in therapy. Patient reports he is eating better. He enjoys drinking milk and having dairy products. He reports eating a bagel with jelly and canteloupe today.   Current DM medication regimen: Metformin 1000mg BID Jardiance 25mg.    Fingerstick Monitoring: FSG AM  FSG PM FSG Bedtime  Last HBA1C:8.6% POCT A1c: 10.2% POCT Glucose: 228   Follow up care: PCP: Dr. Fuentes  Ophthalmology: been awhile  Podiatry: has seen in the past  Nutrition: never seen one

## 2025-07-30 NOTE — ASSESSMENT
[Diabetes Foot Care] : diabetes foot care [Long Term Vascular Complications] : long term vascular complications of diabetes [Carbohydrate Consistent Diet] : carbohydrate consistent diet [Importance of Diet and Exercise] : importance of diet and exercise to improve glycemic control, achieve weight loss and improve cardiovascular health [Exercise/Effect on Glucose] : exercise/effect on glucose [Hypoglycemia Management] : hypoglycemia management [Glucagon Use] : glucagon use [Ketone Testing] : ketone testing [Action and use of Insulin] : action and use of short and long-acting insulin [Self Monitoring of Blood Glucose] : self monitoring of blood glucose [Insulin Self-Administration] : insulin self-administration [Injection Technique, Storage, Sharps Disposal] : injection technique, storage, and sharps disposal [Sick-Day Management] : sick-day management [Retinopathy Screening] : Patient was referred to ophthalmology for retinopathy screening [Diabetic Medications] : Risks and benefits of diabetic medications were discussed [FreeTextEntry1] : Type 2 Diabetes uncontrolled: A1c: 10.2% Glucose: 228 Current regimen Jardiance 25mg + 1000mg BID Metformin,  patient stopped Lantus New regimen: Will send Synjardy 12.5-1000mg BID and restart Lantus 10units  Advised to monitor carbohydrate intake  Eye exam: Due for eye appt Foot exam: Due for podiatry appt LDL: 131 T --- in need of repeat  BP: 127/78 BMI: 24

## 2025-07-30 NOTE — PHYSICAL EXAM
[Alert] : alert [No Acute Distress] : no acute distress [Normal Sclera/Conjunctiva] : normal sclera/conjunctiva [EOMI] : extra ocular movement intact [No Proptosis] : no proptosis [Normal Oropharynx] : the oropharynx was normal [Thyroid Not Enlarged] : the thyroid was not enlarged [No Thyroid Nodules] : no palpable thyroid nodules [No Respiratory Distress] : no respiratory distress [No Accessory Muscle Use] : no accessory muscle use [Clear to Auscultation] : lungs were clear to auscultation bilaterally [Normal S1, S2] : normal S1 and S2 [Normal Rate] : heart rate was normal [Regular Rhythm] : with a regular rhythm [No Edema] : no peripheral edema [Pedal Pulses Normal] : the pedal pulses are present [Soft] : abdomen soft [Spine Straight] : spine straight [No Stigmata of Cushings Syndrome] : no stigmata of Cushings Syndrome [Normal Gait] : normal gait [No Rash] : no rash [Acanthosis Nigricans] : no acanthosis nigricans [Normal Reflexes] : deep tendon reflexes were 2+ and symmetric [No Tremors] : no tremors [Oriented x3] : oriented to person, place, and time [de-identified] : thin male